# Patient Record
Sex: MALE | Race: WHITE | NOT HISPANIC OR LATINO | Employment: UNEMPLOYED | URBAN - METROPOLITAN AREA
[De-identification: names, ages, dates, MRNs, and addresses within clinical notes are randomized per-mention and may not be internally consistent; named-entity substitution may affect disease eponyms.]

---

## 2021-05-24 ENCOUNTER — RECORDS - HEALTHEAST (OUTPATIENT)
Dept: ADMINISTRATIVE | Facility: CLINIC | Age: 51
End: 2021-05-24

## 2023-06-08 ENCOUNTER — HOSPITAL ENCOUNTER (INPATIENT)
Facility: CLINIC | Age: 53
Setting detail: SURGERY ADMIT
End: 2023-06-08
Attending: ORTHOPAEDIC SURGERY | Admitting: ORTHOPAEDIC SURGERY

## 2023-11-20 RX ORDER — BACLOFEN 10 MG/1
10 TABLET ORAL 2 TIMES DAILY PRN
COMMUNITY
End: 2023-12-17

## 2023-11-20 RX ORDER — SILDENAFIL CITRATE 20 MG/1
20-100 TABLET ORAL PRN
COMMUNITY
Start: 2023-11-16

## 2023-11-20 RX ORDER — TESTOSTERONE CYPIONATE 200 MG/ML
200 INJECTION, SOLUTION INTRAMUSCULAR
COMMUNITY
Start: 2023-07-26 | End: 2023-12-17

## 2023-11-20 RX ORDER — FAMOTIDINE 20 MG/1
20 TABLET, FILM COATED ORAL 2 TIMES DAILY PRN
COMMUNITY
Start: 2023-10-06

## 2023-11-20 RX ORDER — DOXYCYCLINE 100 MG/1
100 CAPSULE ORAL 2 TIMES DAILY
COMMUNITY
Start: 2023-11-08

## 2023-11-20 RX ORDER — MELOXICAM 7.5 MG/1
7.5 TABLET ORAL DAILY
Status: ON HOLD | COMMUNITY
End: 2023-12-20

## 2023-11-20 RX ORDER — RIFAMPIN 300 MG/1
300 CAPSULE ORAL 2 TIMES DAILY
COMMUNITY
Start: 2023-10-27

## 2023-11-20 RX ORDER — NAPROXEN SODIUM 220 MG
220 TABLET ORAL
Status: ON HOLD | COMMUNITY
End: 2023-12-20

## 2023-11-20 RX ORDER — DULAGLUTIDE 1.5 MG/.5ML
1.5 INJECTION, SOLUTION SUBCUTANEOUS
COMMUNITY
Start: 2022-11-23

## 2023-11-20 RX ORDER — LISINOPRIL 40 MG/1
40 TABLET ORAL DAILY
COMMUNITY
Start: 2023-06-08 | End: 2024-06-07

## 2023-11-20 RX ORDER — HYDROXYZINE PAMOATE 25 MG/1
25-50 CAPSULE ORAL EVERY 6 HOURS PRN
COMMUNITY
Start: 2023-10-27 | End: 2023-12-17

## 2023-11-20 RX ORDER — DIPHENHYDRAMINE HCL 50 MG
50 CAPSULE ORAL
COMMUNITY

## 2023-11-20 RX ORDER — OXYCODONE HYDROCHLORIDE 5 MG/1
5-10 TABLET ORAL EVERY 12 HOURS PRN
COMMUNITY
Start: 2023-10-27 | End: 2023-12-17

## 2023-11-20 RX ORDER — ACETAMINOPHEN 500 MG
1000 TABLET ORAL EVERY MORNING
COMMUNITY
Start: 2023-10-06

## 2023-11-20 RX ORDER — AMOXICILLIN 250 MG
1 CAPSULE ORAL 2 TIMES DAILY PRN
COMMUNITY
Start: 2023-10-06 | End: 2023-12-17

## 2023-11-20 RX ORDER — ATORVASTATIN CALCIUM 10 MG/1
10 TABLET, FILM COATED ORAL DAILY
COMMUNITY
Start: 2022-11-23 | End: 2023-12-17

## 2023-11-20 RX ORDER — VITAMIN B COMPLEX
1 TABLET ORAL DAILY
COMMUNITY

## 2023-11-20 RX ORDER — ASPIRIN 81 MG/1
162 TABLET ORAL DAILY
COMMUNITY
Start: 2023-10-06

## 2023-12-17 RX ORDER — TESTOSTERONE GEL, 1% 10 MG/G
2 GEL TRANSDERMAL DAILY
COMMUNITY

## 2023-12-17 RX ORDER — ATORVASTATIN CALCIUM 10 MG/1
10 TABLET, FILM COATED ORAL DAILY
COMMUNITY

## 2023-12-17 NOTE — PHARMACY-ADMISSION MEDICATION HISTORY
Pharmacist Admission Medication History    PTA meds completed by pre-admitting nurse, Sandee Antonio, and reviewed by pharmacy.    Pharmacist called patient to confirm PTA med list (12/17/2023).    Prior to Admission medications    Medication Sig Last Dose Taking? Auth Provider Long Term End Date   acetaminophen (TYLENOL) 500 MG tablet Take 1,000 mg by mouth every morning 12/15/2023 Yes Reported, Patient     aspirin 81 MG EC tablet Take 162 mg by mouth daily Past Week Yes Reported, Patient     atorvastatin (LIPITOR) 10 MG tablet Take 10 mg by mouth daily 12/12/2023 Yes Unknown, Entered By History Yes    diphenhydrAMINE (BENADRYL) 50 MG capsule Take 50 mg by mouth nightly as needed  Yes Reported, Patient     doxycycline monohydrate (MONODOX) 100 MG capsule Take 100 mg by mouth 2 times daily 12/17/2023 at hs Yes Reported, Patient     dulaglutide (TRULICITY) 1.5 MG/0.5ML pen Inject 1.5 mg Subcutaneous every 7 days 12/9/2023 Yes Reported, Patient     empagliflozin (JARDIANCE) 10 MG TABS tablet Take 10 mg by mouth daily 12/14/2023 Yes Reported, Patient     famotidine (PEPCID) 20 MG tablet Take 20 mg by mouth 2 times daily as needed More than a month Yes Reported, Patient     lisinopril (ZESTRIL) 40 MG tablet Take 40 mg by mouth daily 12/14/2023 Yes Reported, Patient Yes 6/7/24   meloxicam (MOBIC) 7.5 MG tablet Take 7.5 mg by mouth daily Past Week Yes Reported, Patient Yes    naproxen sodium (ANAPROX) 220 MG tablet Take 220 mg by mouth daily (with breakfast) Past Week Yes Reported, Patient     rifampin (RIFADIN) 300 MG capsule Take 300 mg by mouth 2 times daily 12/17/2023 at hs Yes Reported, Patient     sildenafil (REVATIO) 20 MG tablet Take  mg by mouth as needed  Yes Reported, Patient Yes    testosterone (ANDROGEL 1 % PUMP) 12.5 MG/ACT (1%) gel Place 2 Pump onto the skin daily Admin #1 pump topically to each shoulder 12/11/2023 Yes Unknown, Entered By History Yes    vitamin B-Complex Take 1 tablet by mouth daily  12/12/2023 Yes Reported, Patient     Vitamin D3 (VITAMIN D, CHOLECALCIFEROL,) 25 mcg (1000 units) tablet Take 1 tablet by mouth daily 12/16/2023 Yes Reported, Patient

## 2023-12-18 ENCOUNTER — APPOINTMENT (OUTPATIENT)
Dept: GENERAL RADIOLOGY | Facility: CLINIC | Age: 53
DRG: 455 | End: 2023-12-18
Attending: ORTHOPAEDIC SURGERY
Payer: COMMERCIAL

## 2023-12-18 ENCOUNTER — HOSPITAL ENCOUNTER (INPATIENT)
Facility: CLINIC | Age: 53
LOS: 3 days | Discharge: HOME | DRG: 455 | End: 2023-12-21
Attending: ORTHOPAEDIC SURGERY | Admitting: ORTHOPAEDIC SURGERY
Payer: COMMERCIAL

## 2023-12-18 ENCOUNTER — ANESTHESIA EVENT (OUTPATIENT)
Dept: SURGERY | Facility: CLINIC | Age: 53
DRG: 455 | End: 2023-12-18
Payer: COMMERCIAL

## 2023-12-18 ENCOUNTER — ANESTHESIA (OUTPATIENT)
Dept: SURGERY | Facility: CLINIC | Age: 53
DRG: 455 | End: 2023-12-18
Payer: COMMERCIAL

## 2023-12-18 DIAGNOSIS — Z98.890 H/O SPINAL SURGERY: Primary | ICD-10-CM

## 2023-12-18 LAB
ABO/RH(D): NORMAL
ANTIBODY SCREEN: NEGATIVE
GLUCOSE BLDC GLUCOMTR-MCNC: 164 MG/DL (ref 70–99)
GLUCOSE BLDC GLUCOMTR-MCNC: 198 MG/DL (ref 70–99)
GLUCOSE BLDC GLUCOMTR-MCNC: 201 MG/DL (ref 70–99)
GLUCOSE BLDC GLUCOMTR-MCNC: 237 MG/DL (ref 70–99)
GLUCOSE BLDC GLUCOMTR-MCNC: 266 MG/DL (ref 70–99)
HGB BLD-MCNC: 15.7 G/DL (ref 13.3–17.7)
SPECIMEN EXPIRATION DATE: NORMAL

## 2023-12-18 PROCEDURE — 85018 HEMOGLOBIN: CPT | Performed by: ORTHOPAEDIC SURGERY

## 2023-12-18 PROCEDURE — 0SG00A0 FUSION OF LUMBAR VERTEBRAL JOINT WITH INTERBODY FUSION DEVICE, ANTERIOR APPROACH, ANTERIOR COLUMN, OPEN APPROACH: ICD-10-PCS | Performed by: ORTHOPAEDIC SURGERY

## 2023-12-18 PROCEDURE — 250N000011 HC RX IP 250 OP 636: Performed by: NURSE ANESTHETIST, CERTIFIED REGISTERED

## 2023-12-18 PROCEDURE — 8E0WXBF COMPUTER ASSISTED PROCEDURE OF TRUNK REGION, WITH FLUOROSCOPY: ICD-10-PCS | Performed by: ORTHOPAEDIC SURGERY

## 2023-12-18 PROCEDURE — 250N000013 HC RX MED GY IP 250 OP 250 PS 637: Performed by: STUDENT IN AN ORGANIZED HEALTH CARE EDUCATION/TRAINING PROGRAM

## 2023-12-18 PROCEDURE — 272N000002 HC OR SUPPLY OTHER OPNP: Performed by: ORTHOPAEDIC SURGERY

## 2023-12-18 PROCEDURE — 4A11X4G MONITORING OF PERIPHERAL NERVOUS ELECTRICAL ACTIVITY, INTRAOPERATIVE, EXTERNAL APPROACH: ICD-10-PCS | Performed by: ORTHOPAEDIC SURGERY

## 2023-12-18 PROCEDURE — 250N000012 HC RX MED GY IP 250 OP 636 PS 637: Performed by: STUDENT IN AN ORGANIZED HEALTH CARE EDUCATION/TRAINING PROGRAM

## 2023-12-18 PROCEDURE — 250N000013 HC RX MED GY IP 250 OP 250 PS 637

## 2023-12-18 PROCEDURE — 250N000009 HC RX 250: Performed by: ORTHOPAEDIC SURGERY

## 2023-12-18 PROCEDURE — 999N000179 XR SURGERY CARM FLUORO LESS THAN 5 MIN W STILLS: Mod: TC

## 2023-12-18 PROCEDURE — 36415 COLL VENOUS BLD VENIPUNCTURE: CPT | Performed by: ORTHOPAEDIC SURGERY

## 2023-12-18 PROCEDURE — 250N000011 HC RX IP 250 OP 636

## 2023-12-18 PROCEDURE — 370N000017 HC ANESTHESIA TECHNICAL FEE, PER MIN: Performed by: ORTHOPAEDIC SURGERY

## 2023-12-18 PROCEDURE — 250N000009 HC RX 250: Performed by: NURSE ANESTHETIST, CERTIFIED REGISTERED

## 2023-12-18 PROCEDURE — 250N000011 HC RX IP 250 OP 636: Mod: JZ | Performed by: ORTHOPAEDIC SURGERY

## 2023-12-18 PROCEDURE — 272N000001 HC OR GENERAL SUPPLY STERILE: Performed by: ORTHOPAEDIC SURGERY

## 2023-12-18 PROCEDURE — 922N000001 HC NEURO MONITORING SERVICE, UP TO 7 HOURS (T1FEE): Performed by: ORTHOPAEDIC SURGERY

## 2023-12-18 PROCEDURE — 710N000009 HC RECOVERY PHASE 1, LEVEL 1, PER MIN: Performed by: ORTHOPAEDIC SURGERY

## 2023-12-18 PROCEDURE — 999N000141 HC STATISTIC PRE-PROCEDURE NURSING ASSESSMENT: Performed by: ORTHOPAEDIC SURGERY

## 2023-12-18 PROCEDURE — C1713 ANCHOR/SCREW BN/BN,TIS/BN: HCPCS | Performed by: ORTHOPAEDIC SURGERY

## 2023-12-18 PROCEDURE — 360N000086 HC SURGERY LEVEL 6 W/ FLUORO, PER MIN: Performed by: ORTHOPAEDIC SURGERY

## 2023-12-18 PROCEDURE — 258N000003 HC RX IP 258 OP 636: Performed by: ANESTHESIOLOGY

## 2023-12-18 PROCEDURE — 250N000011 HC RX IP 250 OP 636: Performed by: ORTHOPAEDIC SURGERY

## 2023-12-18 PROCEDURE — 120N000001 HC R&B MED SURG/OB

## 2023-12-18 PROCEDURE — 258N000003 HC RX IP 258 OP 636

## 2023-12-18 PROCEDURE — 258N000003 HC RX IP 258 OP 636: Performed by: ORTHOPAEDIC SURGERY

## 2023-12-18 PROCEDURE — 258N000003 HC RX IP 258 OP 636: Performed by: NURSE ANESTHETIST, CERTIFIED REGISTERED

## 2023-12-18 PROCEDURE — 250N000011 HC RX IP 250 OP 636: Performed by: ANESTHESIOLOGY

## 2023-12-18 PROCEDURE — 250N000013 HC RX MED GY IP 250 OP 250 PS 637: Performed by: ANESTHESIOLOGY

## 2023-12-18 PROCEDURE — 86900 BLOOD TYPING SEROLOGIC ABO: CPT | Performed by: ORTHOPAEDIC SURGERY

## 2023-12-18 PROCEDURE — 01NB0ZZ RELEASE LUMBAR NERVE, OPEN APPROACH: ICD-10-PCS | Performed by: ORTHOPAEDIC SURGERY

## 2023-12-18 PROCEDURE — 0SG0371 FUSION OF LUMBAR VERTEBRAL JOINT WITH AUTOLOGOUS TISSUE SUBSTITUTE, POSTERIOR APPROACH, POSTERIOR COLUMN, PERCUTANEOUS APPROACH: ICD-10-PCS | Performed by: ORTHOPAEDIC SURGERY

## 2023-12-18 PROCEDURE — 250N000025 HC SEVOFLURANE, PER MIN: Performed by: ORTHOPAEDIC SURGERY

## 2023-12-18 PROCEDURE — 250N000013 HC RX MED GY IP 250 OP 250 PS 637: Performed by: NURSE PRACTITIONER

## 2023-12-18 PROCEDURE — 99254 IP/OBS CNSLTJ NEW/EST MOD 60: CPT | Performed by: STUDENT IN AN ORGANIZED HEALTH CARE EDUCATION/TRAINING PROGRAM

## 2023-12-18 PROCEDURE — 0ST20ZZ RESECTION OF LUMBAR VERTEBRAL DISC, OPEN APPROACH: ICD-10-PCS | Performed by: ORTHOPAEDIC SURGERY

## 2023-12-18 PROCEDURE — 99254 IP/OBS CNSLTJ NEW/EST MOD 60: CPT | Mod: VID | Performed by: NURSE PRACTITIONER

## 2023-12-18 DEVICE — IMP WIRE KIRSCHNER AMENDIA 1.4MMX18" THRD 9080-18T: Type: IMPLANTABLE DEVICE | Site: SPINE LUMBAR | Status: FUNCTIONAL

## 2023-12-18 DEVICE — SCREW SET SPNE STAR OPN LNK PATHLOC-L STRL LF: Type: IMPLANTABLE DEVICE | Site: SPINE LUMBAR | Status: FUNCTIONAL

## 2023-12-18 DEVICE — IMPLANTABLE DEVICE: Type: IMPLANTABLE DEVICE | Site: SPINE LUMBAR | Status: FUNCTIONAL

## 2023-12-18 RX ORDER — METHOCARBAMOL 750 MG/1
750 TABLET, FILM COATED ORAL EVERY 6 HOURS
Status: DISCONTINUED | OUTPATIENT
Start: 2023-12-18 | End: 2023-12-19

## 2023-12-18 RX ORDER — BUPIVACAINE HYDROCHLORIDE 2.5 MG/ML
INJECTION, SOLUTION EPIDURAL; INFILTRATION; INTRACAUDAL PRN
Status: DISCONTINUED | OUTPATIENT
Start: 2023-12-18 | End: 2023-12-18 | Stop reason: HOSPADM

## 2023-12-18 RX ORDER — ONDANSETRON 4 MG/1
4 TABLET, ORALLY DISINTEGRATING ORAL EVERY 6 HOURS PRN
Status: DISCONTINUED | OUTPATIENT
Start: 2023-12-18 | End: 2023-12-21 | Stop reason: HOSPADM

## 2023-12-18 RX ORDER — FENTANYL CITRATE 50 UG/ML
25 INJECTION, SOLUTION INTRAMUSCULAR; INTRAVENOUS EVERY 5 MIN PRN
Status: DISCONTINUED | OUTPATIENT
Start: 2023-12-18 | End: 2023-12-18 | Stop reason: HOSPADM

## 2023-12-18 RX ORDER — RIFAMPIN 300 MG/1
300 CAPSULE ORAL 2 TIMES DAILY
Status: DISCONTINUED | OUTPATIENT
Start: 2023-12-18 | End: 2023-12-21 | Stop reason: HOSPADM

## 2023-12-18 RX ORDER — HYDROMORPHONE HCL IN WATER/PF 6 MG/30 ML
0.4 PATIENT CONTROLLED ANALGESIA SYRINGE INTRAVENOUS EVERY 5 MIN PRN
Status: DISCONTINUED | OUTPATIENT
Start: 2023-12-18 | End: 2023-12-18 | Stop reason: HOSPADM

## 2023-12-18 RX ORDER — SODIUM CHLORIDE, SODIUM LACTATE, POTASSIUM CHLORIDE, CALCIUM CHLORIDE 600; 310; 30; 20 MG/100ML; MG/100ML; MG/100ML; MG/100ML
INJECTION, SOLUTION INTRAVENOUS CONTINUOUS
Status: DISCONTINUED | OUTPATIENT
Start: 2023-12-18 | End: 2023-12-18 | Stop reason: HOSPADM

## 2023-12-18 RX ORDER — DEXAMETHASONE SODIUM PHOSPHATE 4 MG/ML
INJECTION, SOLUTION INTRA-ARTICULAR; INTRALESIONAL; INTRAMUSCULAR; INTRAVENOUS; SOFT TISSUE PRN
Status: DISCONTINUED | OUTPATIENT
Start: 2023-12-18 | End: 2023-12-18

## 2023-12-18 RX ORDER — LABETALOL HYDROCHLORIDE 5 MG/ML
10-20 INJECTION, SOLUTION INTRAVENOUS EVERY 6 HOURS PRN
Status: DISCONTINUED | OUTPATIENT
Start: 2023-12-18 | End: 2023-12-21 | Stop reason: HOSPADM

## 2023-12-18 RX ORDER — AMOXICILLIN 250 MG
1 CAPSULE ORAL 2 TIMES DAILY
Status: DISCONTINUED | OUTPATIENT
Start: 2023-12-18 | End: 2023-12-21 | Stop reason: HOSPADM

## 2023-12-18 RX ORDER — LABETALOL HYDROCHLORIDE 5 MG/ML
10 INJECTION, SOLUTION INTRAVENOUS ONCE
Status: COMPLETED | OUTPATIENT
Start: 2023-12-18 | End: 2023-12-18

## 2023-12-18 RX ORDER — HYDROMORPHONE HCL IN WATER/PF 6 MG/30 ML
0.2 PATIENT CONTROLLED ANALGESIA SYRINGE INTRAVENOUS EVERY 5 MIN PRN
Status: DISCONTINUED | OUTPATIENT
Start: 2023-12-18 | End: 2023-12-18 | Stop reason: HOSPADM

## 2023-12-18 RX ORDER — LIDOCAINE 40 MG/G
CREAM TOPICAL
Status: DISCONTINUED | OUTPATIENT
Start: 2023-12-18 | End: 2023-12-18 | Stop reason: HOSPADM

## 2023-12-18 RX ORDER — NALOXONE HYDROCHLORIDE 0.4 MG/ML
0.2 INJECTION, SOLUTION INTRAMUSCULAR; INTRAVENOUS; SUBCUTANEOUS
Status: DISCONTINUED | OUTPATIENT
Start: 2023-12-18 | End: 2023-12-21 | Stop reason: HOSPADM

## 2023-12-18 RX ORDER — HYDROMORPHONE HYDROCHLORIDE 4 MG/1
4 TABLET ORAL
Status: DISCONTINUED | OUTPATIENT
Start: 2023-12-18 | End: 2023-12-19

## 2023-12-18 RX ORDER — ONDANSETRON 2 MG/ML
4 INJECTION INTRAMUSCULAR; INTRAVENOUS EVERY 30 MIN PRN
Status: DISCONTINUED | OUTPATIENT
Start: 2023-12-18 | End: 2023-12-18 | Stop reason: HOSPADM

## 2023-12-18 RX ORDER — OXYCODONE HYDROCHLORIDE 5 MG/1
5 TABLET ORAL
Status: DISCONTINUED | OUTPATIENT
Start: 2023-12-18 | End: 2023-12-18 | Stop reason: HOSPADM

## 2023-12-18 RX ORDER — FENTANYL CITRATE 50 UG/ML
50 INJECTION, SOLUTION INTRAMUSCULAR; INTRAVENOUS EVERY 5 MIN PRN
Status: DISCONTINUED | OUTPATIENT
Start: 2023-12-18 | End: 2023-12-18 | Stop reason: HOSPADM

## 2023-12-18 RX ORDER — ACETAMINOPHEN 325 MG/1
975 TABLET ORAL EVERY 8 HOURS
Status: COMPLETED | OUTPATIENT
Start: 2023-12-18 | End: 2023-12-21

## 2023-12-18 RX ORDER — GLYCOPYRROLATE 0.2 MG/ML
INJECTION, SOLUTION INTRAMUSCULAR; INTRAVENOUS PRN
Status: DISCONTINUED | OUTPATIENT
Start: 2023-12-18 | End: 2023-12-18

## 2023-12-18 RX ORDER — HYDROMORPHONE HCL IN WATER/PF 6 MG/30 ML
0.2 PATIENT CONTROLLED ANALGESIA SYRINGE INTRAVENOUS
Status: DISCONTINUED | OUTPATIENT
Start: 2023-12-18 | End: 2023-12-18

## 2023-12-18 RX ORDER — POLYETHYLENE GLYCOL 3350 17 G/17G
17 POWDER, FOR SOLUTION ORAL DAILY
Status: DISCONTINUED | OUTPATIENT
Start: 2023-12-19 | End: 2023-12-21 | Stop reason: HOSPADM

## 2023-12-18 RX ORDER — LIDOCAINE 40 MG/G
CREAM TOPICAL
Status: DISCONTINUED | OUTPATIENT
Start: 2023-12-18 | End: 2023-12-21 | Stop reason: HOSPADM

## 2023-12-18 RX ORDER — FENTANYL CITRATE 50 UG/ML
INJECTION, SOLUTION INTRAMUSCULAR; INTRAVENOUS PRN
Status: DISCONTINUED | OUTPATIENT
Start: 2023-12-18 | End: 2023-12-18

## 2023-12-18 RX ORDER — ONDANSETRON 2 MG/ML
4 INJECTION INTRAMUSCULAR; INTRAVENOUS EVERY 6 HOURS PRN
Status: DISCONTINUED | OUTPATIENT
Start: 2023-12-18 | End: 2023-12-21 | Stop reason: HOSPADM

## 2023-12-18 RX ORDER — HYDROMORPHONE HCL IN WATER/PF 6 MG/30 ML
0.2 PATIENT CONTROLLED ANALGESIA SYRINGE INTRAVENOUS EVERY 4 HOURS PRN
Status: DISCONTINUED | OUTPATIENT
Start: 2023-12-18 | End: 2023-12-20

## 2023-12-18 RX ORDER — FAMOTIDINE 20 MG/1
20 TABLET, FILM COATED ORAL 2 TIMES DAILY PRN
Status: DISCONTINUED | OUTPATIENT
Start: 2023-12-18 | End: 2023-12-21 | Stop reason: HOSPADM

## 2023-12-18 RX ORDER — OXYCODONE HCL 10 MG/1
20 TABLET, FILM COATED, EXTENDED RELEASE ORAL EVERY 12 HOURS
Status: DISCONTINUED | OUTPATIENT
Start: 2023-12-18 | End: 2023-12-18

## 2023-12-18 RX ORDER — DEXTROSE MONOHYDRATE 25 G/50ML
25-50 INJECTION, SOLUTION INTRAVENOUS
Status: DISCONTINUED | OUTPATIENT
Start: 2023-12-18 | End: 2023-12-21 | Stop reason: HOSPADM

## 2023-12-18 RX ORDER — METHOCARBAMOL 750 MG/1
750 TABLET, FILM COATED ORAL EVERY 6 HOURS PRN
Status: DISCONTINUED | OUTPATIENT
Start: 2023-12-18 | End: 2023-12-18

## 2023-12-18 RX ORDER — LIDOCAINE HYDROCHLORIDE 20 MG/ML
INJECTION, SOLUTION INFILTRATION; PERINEURAL PRN
Status: DISCONTINUED | OUTPATIENT
Start: 2023-12-18 | End: 2023-12-18

## 2023-12-18 RX ORDER — BISACODYL 10 MG
10 SUPPOSITORY, RECTAL RECTAL DAILY PRN
Status: DISCONTINUED | OUTPATIENT
Start: 2023-12-18 | End: 2023-12-21 | Stop reason: HOSPADM

## 2023-12-18 RX ORDER — DOXYCYCLINE 100 MG/1
100 CAPSULE ORAL 2 TIMES DAILY
Status: DISCONTINUED | OUTPATIENT
Start: 2023-12-18 | End: 2023-12-21 | Stop reason: HOSPADM

## 2023-12-18 RX ORDER — OXYCODONE HYDROCHLORIDE 10 MG/1
10 TABLET ORAL
Status: COMPLETED | OUTPATIENT
Start: 2023-12-18 | End: 2023-12-18

## 2023-12-18 RX ORDER — TRANEXAMIC ACID 10 MG/ML
5 INJECTION, SOLUTION INTRAVENOUS CONTINUOUS
Status: DISCONTINUED | OUTPATIENT
Start: 2023-12-18 | End: 2023-12-18 | Stop reason: HOSPADM

## 2023-12-18 RX ORDER — PROPOFOL 10 MG/ML
INJECTION, EMULSION INTRAVENOUS PRN
Status: DISCONTINUED | OUTPATIENT
Start: 2023-12-18 | End: 2023-12-18

## 2023-12-18 RX ORDER — ATORVASTATIN CALCIUM 10 MG/1
10 TABLET, FILM COATED ORAL DAILY
Status: DISCONTINUED | OUTPATIENT
Start: 2023-12-18 | End: 2023-12-21 | Stop reason: HOSPADM

## 2023-12-18 RX ORDER — PROCHLORPERAZINE MALEATE 5 MG
10 TABLET ORAL EVERY 6 HOURS PRN
Status: DISCONTINUED | OUTPATIENT
Start: 2023-12-18 | End: 2023-12-21 | Stop reason: HOSPADM

## 2023-12-18 RX ORDER — OXYCODONE HYDROCHLORIDE 10 MG/1
10 TABLET ORAL EVERY 4 HOURS PRN
Status: DISCONTINUED | OUTPATIENT
Start: 2023-12-18 | End: 2023-12-18

## 2023-12-18 RX ORDER — CEFAZOLIN SODIUM/WATER 3 G/30 ML
3 SYRINGE (ML) INTRAVENOUS
Status: COMPLETED | OUTPATIENT
Start: 2023-12-18 | End: 2023-12-18

## 2023-12-18 RX ORDER — SODIUM CHLORIDE 9 MG/ML
INJECTION, SOLUTION INTRAVENOUS CONTINUOUS
Status: DISCONTINUED | OUTPATIENT
Start: 2023-12-18 | End: 2023-12-21 | Stop reason: HOSPADM

## 2023-12-18 RX ORDER — KETAMINE HYDROCHLORIDE 10 MG/ML
INJECTION INTRAMUSCULAR; INTRAVENOUS PRN
Status: DISCONTINUED | OUTPATIENT
Start: 2023-12-18 | End: 2023-12-18

## 2023-12-18 RX ORDER — ONDANSETRON 4 MG/1
4 TABLET, ORALLY DISINTEGRATING ORAL EVERY 30 MIN PRN
Status: DISCONTINUED | OUTPATIENT
Start: 2023-12-18 | End: 2023-12-18 | Stop reason: HOSPADM

## 2023-12-18 RX ORDER — NICOTINE POLACRILEX 4 MG
15-30 LOZENGE BUCCAL
Status: DISCONTINUED | OUTPATIENT
Start: 2023-12-18 | End: 2023-12-21 | Stop reason: HOSPADM

## 2023-12-18 RX ORDER — NALOXONE HYDROCHLORIDE 0.4 MG/ML
0.4 INJECTION, SOLUTION INTRAMUSCULAR; INTRAVENOUS; SUBCUTANEOUS
Status: DISCONTINUED | OUTPATIENT
Start: 2023-12-18 | End: 2023-12-21 | Stop reason: HOSPADM

## 2023-12-18 RX ORDER — ONDANSETRON 2 MG/ML
INJECTION INTRAMUSCULAR; INTRAVENOUS PRN
Status: DISCONTINUED | OUTPATIENT
Start: 2023-12-18 | End: 2023-12-18

## 2023-12-18 RX ORDER — CEFAZOLIN SODIUM/WATER 3 G/30 ML
3 SYRINGE (ML) INTRAVENOUS SEE ADMIN INSTRUCTIONS
Status: DISCONTINUED | OUTPATIENT
Start: 2023-12-18 | End: 2023-12-18 | Stop reason: HOSPADM

## 2023-12-18 RX ORDER — ACETAMINOPHEN 325 MG/1
650 TABLET ORAL EVERY 4 HOURS PRN
Status: DISCONTINUED | OUTPATIENT
Start: 2023-12-21 | End: 2023-12-21 | Stop reason: HOSPADM

## 2023-12-18 RX ORDER — HYDROMORPHONE HYDROCHLORIDE 1 MG/ML
0.5 INJECTION, SOLUTION INTRAMUSCULAR; INTRAVENOUS; SUBCUTANEOUS
Status: DISCONTINUED | OUTPATIENT
Start: 2023-12-18 | End: 2023-12-18

## 2023-12-18 RX ORDER — HYDROMORPHONE HCL IN WATER/PF 6 MG/30 ML
0.4 PATIENT CONTROLLED ANALGESIA SYRINGE INTRAVENOUS EVERY 4 HOURS PRN
Status: DISCONTINUED | OUTPATIENT
Start: 2023-12-18 | End: 2023-12-20

## 2023-12-18 RX ORDER — HYDROXYZINE HYDROCHLORIDE 25 MG/1
25 TABLET, FILM COATED ORAL EVERY 6 HOURS PRN
Status: DISCONTINUED | OUTPATIENT
Start: 2023-12-18 | End: 2023-12-21 | Stop reason: HOSPADM

## 2023-12-18 RX ADMIN — DEXMEDETOMIDINE HYDROCHLORIDE 4 MCG: 100 INJECTION, SOLUTION INTRAVENOUS at 08:08

## 2023-12-18 RX ADMIN — PROPOFOL 100 MCG/KG/MIN: 10 INJECTION, EMULSION INTRAVENOUS at 08:11

## 2023-12-18 RX ADMIN — SODIUM CHLORIDE: 9 INJECTION, SOLUTION INTRAVENOUS at 14:34

## 2023-12-18 RX ADMIN — OXYCODONE HYDROCHLORIDE 10 MG: 5 TABLET ORAL at 11:46

## 2023-12-18 RX ADMIN — HYDROMORPHONE HYDROCHLORIDE 0.2 MG: 0.2 INJECTION, SOLUTION INTRAMUSCULAR; INTRAVENOUS; SUBCUTANEOUS at 12:18

## 2023-12-18 RX ADMIN — PROPOFOL 90 MCG/KG/MIN: 10 INJECTION, EMULSION INTRAVENOUS at 09:29

## 2023-12-18 RX ADMIN — HYDROMORPHONE HYDROCHLORIDE 0.5 MG: 1 INJECTION, SOLUTION INTRAMUSCULAR; INTRAVENOUS; SUBCUTANEOUS at 14:29

## 2023-12-18 RX ADMIN — PHENYLEPHRINE HYDROCHLORIDE 50 MCG: 10 INJECTION INTRAVENOUS at 08:52

## 2023-12-18 RX ADMIN — RIFAMPIN 300 MG: 300 CAPSULE ORAL at 19:52

## 2023-12-18 RX ADMIN — SODIUM CHLORIDE, POTASSIUM CHLORIDE, SODIUM LACTATE AND CALCIUM CHLORIDE: 600; 310; 30; 20 INJECTION, SOLUTION INTRAVENOUS at 07:39

## 2023-12-18 RX ADMIN — HYDROMORPHONE HYDROCHLORIDE 0.5 MG: 1 INJECTION, SOLUTION INTRAMUSCULAR; INTRAVENOUS; SUBCUTANEOUS at 08:08

## 2023-12-18 RX ADMIN — INSULIN ASPART 2 UNITS: 100 INJECTION, SOLUTION INTRAVENOUS; SUBCUTANEOUS at 19:02

## 2023-12-18 RX ADMIN — ROCURONIUM BROMIDE 10 MG: 50 INJECTION, SOLUTION INTRAVENOUS at 07:47

## 2023-12-18 RX ADMIN — MIDAZOLAM 2 MG: 1 INJECTION INTRAMUSCULAR; INTRAVENOUS at 07:42

## 2023-12-18 RX ADMIN — Medication 140 MG: at 07:47

## 2023-12-18 RX ADMIN — DOXYCYCLINE HYCLATE 100 MG: 100 CAPSULE ORAL at 19:52

## 2023-12-18 RX ADMIN — PHENYLEPHRINE HYDROCHLORIDE 50 MCG: 10 INJECTION INTRAVENOUS at 08:50

## 2023-12-18 RX ADMIN — FENTANYL CITRATE 50 MCG: 50 INJECTION, SOLUTION INTRAMUSCULAR; INTRAVENOUS at 10:18

## 2023-12-18 RX ADMIN — DEXMEDETOMIDINE HYDROCHLORIDE 4 MCG: 100 INJECTION, SOLUTION INTRAVENOUS at 08:00

## 2023-12-18 RX ADMIN — FENTANYL CITRATE 50 MCG: 50 INJECTION, SOLUTION INTRAMUSCULAR; INTRAVENOUS at 10:27

## 2023-12-18 RX ADMIN — HYDROXYZINE HYDROCHLORIDE 25 MG: 25 TABLET, FILM COATED ORAL at 22:02

## 2023-12-18 RX ADMIN — PROPOFOL 250 MG: 10 INJECTION, EMULSION INTRAVENOUS at 07:47

## 2023-12-18 RX ADMIN — Medication 3 G: at 07:38

## 2023-12-18 RX ADMIN — METHOCARBAMOL TABLETS 750 MG: 750 TABLET, COATED ORAL at 23:52

## 2023-12-18 RX ADMIN — HYDROMORPHONE HYDROCHLORIDE 4 MG: 4 TABLET ORAL at 15:45

## 2023-12-18 RX ADMIN — PHENYLEPHRINE HYDROCHLORIDE 50 MCG: 10 INJECTION INTRAVENOUS at 08:34

## 2023-12-18 RX ADMIN — ONDANSETRON 4 MG: 2 INJECTION INTRAMUSCULAR; INTRAVENOUS at 09:39

## 2023-12-18 RX ADMIN — HYDROMORPHONE HYDROCHLORIDE 0.2 MG: 0.2 INJECTION, SOLUTION INTRAMUSCULAR; INTRAVENOUS; SUBCUTANEOUS at 12:28

## 2023-12-18 RX ADMIN — DEXMEDETOMIDINE HYDROCHLORIDE 4 MCG: 100 INJECTION, SOLUTION INTRAVENOUS at 07:53

## 2023-12-18 RX ADMIN — HYDROMORPHONE HYDROCHLORIDE 0.2 MG: 0.2 INJECTION, SOLUTION INTRAMUSCULAR; INTRAVENOUS; SUBCUTANEOUS at 10:53

## 2023-12-18 RX ADMIN — HYDROMORPHONE HYDROCHLORIDE 4 MG: 4 TABLET ORAL at 18:57

## 2023-12-18 RX ADMIN — LIDOCAINE HYDROCHLORIDE 50 MG: 20 INJECTION, SOLUTION INFILTRATION; PERINEURAL at 07:47

## 2023-12-18 RX ADMIN — SODIUM CHLORIDE, POTASSIUM CHLORIDE, SODIUM LACTATE AND CALCIUM CHLORIDE: 600; 310; 30; 20 INJECTION, SOLUTION INTRAVENOUS at 07:01

## 2023-12-18 RX ADMIN — HYDROMORPHONE HYDROCHLORIDE 0.2 MG: 0.2 INJECTION, SOLUTION INTRAMUSCULAR; INTRAVENOUS; SUBCUTANEOUS at 11:30

## 2023-12-18 RX ADMIN — METHOCARBAMOL TABLETS 750 MG: 750 TABLET, COATED ORAL at 16:53

## 2023-12-18 RX ADMIN — SODIUM CHLORIDE, POTASSIUM CHLORIDE, SODIUM LACTATE AND CALCIUM CHLORIDE: 600; 310; 30; 20 INJECTION, SOLUTION INTRAVENOUS at 09:38

## 2023-12-18 RX ADMIN — ACETAMINOPHEN 975 MG: 325 TABLET, FILM COATED ORAL at 18:57

## 2023-12-18 RX ADMIN — LABETALOL HYDROCHLORIDE 10 MG: 5 INJECTION, SOLUTION INTRAVENOUS at 12:07

## 2023-12-18 RX ADMIN — HYDROMORPHONE HYDROCHLORIDE 0.2 MG: 1 INJECTION, SOLUTION INTRAMUSCULAR; INTRAVENOUS; SUBCUTANEOUS at 10:47

## 2023-12-18 RX ADMIN — Medication 50 MG: at 07:47

## 2023-12-18 RX ADMIN — METHOCARBAMOL 750 MG: 750 TABLET ORAL at 11:02

## 2023-12-18 RX ADMIN — PHENYLEPHRINE HYDROCHLORIDE 0.1 MCG/KG/MIN: 10 INJECTION INTRAVENOUS at 08:57

## 2023-12-18 RX ADMIN — TRANEXAMIC ACID 1.44 G: 1 INJECTION, SOLUTION INTRAVENOUS at 07:57

## 2023-12-18 RX ADMIN — PROPOFOL 80 MCG/KG/MIN: 10 INJECTION, EMULSION INTRAVENOUS at 08:48

## 2023-12-18 RX ADMIN — GLYCOPYRROLATE 0.2 MG: 0.2 INJECTION, SOLUTION INTRAMUSCULAR; INTRAVENOUS at 07:47

## 2023-12-18 RX ADMIN — DEXMEDETOMIDINE HYDROCHLORIDE 4 MCG: 100 INJECTION, SOLUTION INTRAVENOUS at 08:06

## 2023-12-18 RX ADMIN — ACETAMINOPHEN 975 MG: 325 TABLET, FILM COATED ORAL at 11:03

## 2023-12-18 RX ADMIN — DEXAMETHASONE SODIUM PHOSPHATE 4 MG: 4 INJECTION, SOLUTION INTRA-ARTICULAR; INTRALESIONAL; INTRAMUSCULAR; INTRAVENOUS; SOFT TISSUE at 08:39

## 2023-12-18 RX ADMIN — HYDROMORPHONE HYDROCHLORIDE 6 MG: 2 TABLET ORAL at 22:02

## 2023-12-18 RX ADMIN — PHENYLEPHRINE HYDROCHLORIDE 50 MCG: 10 INJECTION INTRAVENOUS at 09:55

## 2023-12-18 RX ADMIN — HYDROMORPHONE HYDROCHLORIDE 0.5 MG: 1 INJECTION, SOLUTION INTRAMUSCULAR; INTRAVENOUS; SUBCUTANEOUS at 07:56

## 2023-12-18 RX ADMIN — HYDROXYZINE HYDROCHLORIDE 25 MG: 25 TABLET, FILM COATED ORAL at 11:03

## 2023-12-18 RX ADMIN — DEXMEDETOMIDINE HYDROCHLORIDE 0.3 MCG/KG/HR: 100 INJECTION, SOLUTION INTRAVENOUS at 08:15

## 2023-12-18 RX ADMIN — FENTANYL CITRATE 100 MCG: 50 INJECTION INTRAMUSCULAR; INTRAVENOUS at 07:47

## 2023-12-18 ASSESSMENT — ACTIVITIES OF DAILY LIVING (ADL)
ADLS_ACUITY_SCORE: 20
ADLS_ACUITY_SCORE: 20
ADLS_ACUITY_SCORE: 21
ADLS_ACUITY_SCORE: 22
ADLS_ACUITY_SCORE: 21
ADLS_ACUITY_SCORE: 20
ADLS_ACUITY_SCORE: 21
ADLS_ACUITY_SCORE: 22
ADLS_ACUITY_SCORE: 21

## 2023-12-18 ASSESSMENT — ENCOUNTER SYMPTOMS: DYSRHYTHMIAS: 0

## 2023-12-18 NOTE — ANESTHESIA POSTPROCEDURE EVALUATION
Patient: Francisco Cortés    Procedure: Procedure(s):  Lumbar four to lumbar five interbody and posterolateral fusion minimally invasive       Anesthesia Type:  General    Note:  Disposition: Inpatient   Postop Pain Control: Uneventful            Sign Out: Well controlled pain   PONV: No   Neuro/Psych: Uneventful            Sign Out: Acceptable/Baseline neuro status   Airway/Respiratory: Uneventful            Sign Out: Acceptable/Baseline resp. status   CV/Hemodynamics: Uneventful            Sign Out: Acceptable CV status; No obvious hypovolemia; No obvious fluid overload   Other NRE:    DID A NON-ROUTINE EVENT OCCUR?            Last vitals:  Vitals Value Taken Time   /64 12/18/23 1222   Temp 97.8  F (36.6  C) 12/18/23 1220   Pulse 85 12/18/23 1234   Resp 63 12/18/23 1234   SpO2 98 % 12/18/23 1235   Vitals shown include unfiled device data.    Electronically Signed By: Claude Velasco MD  December 18, 2023  3:30 PM

## 2023-12-18 NOTE — ANESTHESIA PROCEDURE NOTES
Airway       Patient location during procedure: OR       Procedure Start/Stop Times: 12/18/2023 7:50 AM  Staff -        CRNA: Jefferson Georges APRN CRNA       Performed By: CRNA  Consent for Airway        Urgency: elective  Indications and Patient Condition       Indications for airway management: jeronimo-procedural       Induction type:intravenous       Mask difficulty assessment: 2 - vent by mask + OA or adjuvant +/- NMBA    Final Airway Details       Final airway type: endotracheal airway       Successful airway: ETT - single and Oral  Endotracheal Airway Details        ETT size (mm): 8.0       Cuffed: yes       Cuff volume (mL): 6       Successful intubation technique: video laryngoscopy       VL Blade Size: Glidescope 4       Grade View of Cords: 1       Adjucts: stylet       Position: Right       Measured from: gums/teeth       Secured at (cm): 24       Bite block used: Soft    Post intubation assessment        Placement verified by: capnometry, equal breath sounds and chest rise        Number of attempts at approach: 1       Number of other approaches attempted: 0       Secured with: tape       Ease of procedure: easy       Dentition: Intact and Unchanged    Medication(s) Administered   Medication Administration Time: 12/18/2023 7:50 AM

## 2023-12-18 NOTE — ANESTHESIA PREPROCEDURE EVALUATION
Anesthesia Pre-Procedure Evaluation    Patient: Francisco Cortés   MRN: [id number] : 1970        Procedure : Procedure(s):  Lumbar four to lumbar five interbody and posterolateral fusion minimally invasive versus open          Past Medical History:   Diagnosis Date    Arthritis     Diabetes (H)     Hypertension     Obese     Other chronic pain       Past Surgical History:   Procedure Laterality Date    ARTHROPLASTY REVISION KNEE Right 10/04/2023    ARTHROSCOPY KNEE INCISION AND DRAINAGE Right 2023    HAND SURGERY Left     TOTAL HIP ARTHROPLASTY Right 2014    TOTAL HIP ARTHROPLASTY Left 2014    TOTAL KNEE ARTHROPLASTY Right 2023      Allergies   Allergen Reactions    Nuts Anaphylaxis     Only Tree nuts such as Walnuts and Pecans per pt   Tongue and throat swelling    Pregabalin Other (See Comments)     Suicidal idealation per patient    Gabapentin Other (See Comments)     Suicidal ideation    Tramadol Itching      Social History     Tobacco Use    Smoking status: Former     Types: Cigars     Quit date:      Years since quittin.9    Smokeless tobacco: Never   Substance Use Topics    Alcohol use: Yes     Comment: occ      Wt Readings from Last 1 Encounters:   23 144.9 kg (319 lb 6.4 oz)        Anesthesia Evaluation            ROS/MED HX  ENT/Pulmonary:    (-) sleep apnea   Neurologic:     (+)    peripheral neuropathy,                            Cardiovascular: Comment: DATE OF SERVICE:  2015     CARDIOVASCULAR MAGNETIC RESONANCE     REQUESTING PROVIDER: Krysta Carpenter MD     INDICATION: Hemochromatosis, for evaluation of cardiac iron overload.     TECHNIQUE: Standard cardiovascular MRI sequences are used to evaluate   structure and function. T2* values were determined in the myocardium.     FINDINGS: Left ventricle is normal in size with normal left ventricular   systolic function. Borderline concentric left ventricular hypertrophy is   noted. Left ventricular  "end-diastolic dimension was 5.7 cm, left   ventricular end-systolic dimension is 3.5 cm, intraventricular septum 1.5   cm, left ventricular posterior wall 1.1 cm. Left ventricular ejection   fraction is 57 percent. Right ventricle appears to be normal in size with   normal right ventricular systolic function. Left atrium appears to be   mildly enlarged. Right atrium appears to be normal in size.     The aortic valve is grossly normal in structure and function, mitral valve   is grossly normal in structure and function, tricuspid valve is grossly   normal in structure and function. Pulmonic valve is grossly normal in   structure and function. T2* measurements in the myocardium were done (   multiple measurements were done), T2* is greater than 20 milliseconds,   i.e. normal T2* values indicating no significant iron overload in the   myocardium.       (+) Dyslipidemia hypertension- -   -  - -                                   (-) CAD, CHF, arrhythmias and valvular problems/murmurs   METS/Exercise Tolerance:     Hematologic:       Musculoskeletal:       GI/Hepatic:     (+) GERD,            liver disease (Hemochromatosis),       Renal/Genitourinary:       Endo:     (+)  type II DM,       Diabetic complications: neuropathy.      Obesity,       Psychiatric/Substance Use:       Infectious Disease:       Malignancy:       Other:      (+)  , H/O Chronic Pain,         Physical Exam    Airway        Mallampati: II   TM distance: > 3 FB    Mouth opening: > 3 cm    Respiratory Devices and Support         Dental       (+) Multiple visibly decayed, broken teeth      Cardiovascular   cardiovascular exam normal          Pulmonary   pulmonary exam normal            Other findings: No lab results found.   No lab results found.      OUTSIDE LABS:  CBC: No results found for: \"WBC\", \"HGB\", \"HCT\", \"PLT\"  BMP: No results found for: \"NA\", \"POTASSIUM\", \"CHLORIDE\", \"CO2\", \"BUN\", \"CR\", \"GLC\"  COAGS: No results found for: \"PTT\", \"INR\", " "\"FIBR\"  POC: No results found for: \"BGM\", \"HCG\", \"HCGS\"  HEPATIC: No results found for: \"ALBUMIN\", \"PROTTOTAL\", \"ALT\", \"AST\", \"GGT\", \"ALKPHOS\", \"BILITOTAL\", \"BILIDIRECT\", \"TAJ\"  OTHER: No results found for: \"PH\", \"LACT\", \"A1C\", \"RAFAELA\", \"PHOS\", \"MAG\", \"LIPASE\", \"AMYLASE\", \"TSH\", \"T4\", \"T3\", \"CRP\", \"SED\"    Anesthesia Plan    ASA Status:  3       Anesthesia Type: General.     - Airway: ETT   Induction: Propofol.   Maintenance: Balanced.   Techniques and Equipment:     - Airway: Video-Laryngoscope       Consents    Anesthesia Plan(s) and associated risks, benefits, and realistic alternatives discussed. Questions answered and patient/representative(s) expressed understanding.     - Discussed: Risks, Benefits and Alternatives for the PROCEDURE were discussed     - Discussed with:  Patient      - Extended Intubation/Ventilatory Support Discussed: No.      - Patient is DNR/DNI Status: No     Use of blood products discussed: No .     Postoperative Care    Pain management: IV analgesics, Oral pain medications.   PONV prophylaxis: Ondansetron (or other 5HT-3), Background Propofol Infusion     Comments:    Other Comments: Helder Velasco MD    I have reviewed the pertinent notes and labs in the chart from the past 30 days and (re)examined the patient.  Any updates or changes from those notes are reflected in this note.             # Drug Induced Platelet Defect: home medication list includes an antiplatelet medication  # Obesity: Estimated body mass index is 38.88 kg/m  as calculated from the following:    Height as of this encounter: 1.93 m (6' 4\").    Weight as of this encounter: 144.9 kg (319 lb 6.4 oz).      "

## 2023-12-18 NOTE — PROGRESS NOTES
Blood sugar post op is 237.  Notified Dr. Velasco and no treatment ordered at this time.  Mulu Barclay RN on 12/18/2023 at 10:56 AM

## 2023-12-18 NOTE — INTERVAL H&P NOTE
"I have reviewed the surgical (or preoperative) H&P that is linked to this encounter, and examined the patient. There are no significant changes    Clinical Conditions Present on Arrival:  Clinically Significant Risk Factors Present on Admission                 # Drug Induced Platelet Defect: home medication list includes an antiplatelet medication  # Obesity: Estimated body mass index is 38.88 kg/m  as calculated from the following:    Height as of this encounter: 1.93 m (6' 4\").    Weight as of this encounter: 144.9 kg (319 lb 6.4 oz).       "

## 2023-12-18 NOTE — PROVIDER NOTIFICATION
Page sent to RENAE Shaw with Dr. Diaz to clarify that no post-op antibiotics are needed.  Page also sent to hospitalist regarding blood sugar management.     Addendum 1700: Received message from Valeria-no post-op antibiotics needed per Dr. Diaz

## 2023-12-18 NOTE — PROVIDER NOTIFICATION
Patient still reporting increased pain levels-too soon for IV dilaudid. Call placed to Kelin Adrian NP with pain team to discuss.  Does not want to give IV dilaudid this early-try Robaxin first and then can give Atarax an hour later if needed.   Patient's blood pressure elevated, but pain control is an issue currently. Page sent to hospitalist to notify and request any PRN blood pressure meds.     Addendum 1705: Received order for PRN Labetolol.

## 2023-12-18 NOTE — ANESTHESIA CARE TRANSFER NOTE
Patient: Francisco Cortés    Procedure: Procedure(s):  Lumbar four to lumbar five interbody and posterolateral fusion minimally invasive       Diagnosis: Spinal stenosis, lumbar region, without neurogenic claudication [M48.061]  Spondylolisthesis of lumbar region [M43.16]  Diagnosis Additional Information: No value filed.    Anesthesia Type:   General     Note:    Oropharynx: oropharynx clear of all foreign objects and spontaneously breathing  Level of Consciousness: drowsy  Oxygen Supplementation: face mask  Level of Supplemental Oxygen (L/min / FiO2): 8  Independent Airway: airway patency satisfactory and stable  Dentition: dentition unchanged  Vital Signs Stable: post-procedure vital signs reviewed and stable  Report to RN Given: handoff report given  Patient transferred to: PACU    Handoff Report: Identifed the Patient, Identified the Reponsible Provider, Reviewed the pertinent medical history, Discussed the surgical course, Reviewed Intra-OP anesthesia mangement and issues during anesthesia, Set expectations for post-procedure period and Allowed opportunity for questions and acknowledgement of understanding      Vitals:  Vitals Value Taken Time   /74 12/18/23 1010   Temp     Pulse 85 12/18/23 1012   Resp 16 12/18/23 1012   SpO2 94 % 12/18/23 1012   Vitals shown include unfiled device data.    Electronically Signed By: ADA Maloney CRNA  December 18, 2023  10:13 AM

## 2023-12-18 NOTE — CONSULTS
[facility] ACUTE PAIN SERVICE CONSULTATION     Date of Admission:  12/18/2023  Date of Consult (When I saw the patient): 12/18/23     Assessment/Plan:     Francisco Cortés is a 53 year old male who was admitted on 12/18/2023.  Pain team was asked to see the patient for post op pain. Admitted for planned procedure. History of septic right knee joint status post I&D total knee replacement 10/4, cellulitis of left foot, chronic low back pain, chronic osteomyelitis, chronic pain of both lower extremities, degenerative disc disease, diabetes mellitus, diabetic peripheral neuropathy, diabetic ulcer of the left foot, hypertension, obesity, lumbar radiculopathy, polyneuropathy, obesity.  Describes pain as 7-8/10 and aching, sharp. The patient denies nausea, vomiting, constipation, chest pain shortness of breath. The patient reports loose stools with antibiotics. He reports Oxycodone not effective for pain. The patient does not smoke and denies chemical dependency history.     Post op day: Day of Surgery. Lumbar four to lumbar five interbody and posterolateral fusion     PLAN:   1) Pain is consistent with post op pain. Labs and imaging indicated: I have personally reviewed pertinent notes, labs, tests, and radiologic imaging in patient's chart. Treatment plan includes multimodal pain approach, Hospital Medicine Service for medical management, Orthopedics. Patient educated regarding multimodal pain approach, medications as listed below. Patient is understanding of the plan. All questions and concerns addressed to patient's satisfaction.   2)Multimodal Medication Therapy  Topical: None  NSAID'S: None post fusion, he does take Melxociam at home- defer to surgery team when NSAIDs can be resumed  Muscle Relaxants: Robaxin-750 mg every 6 hours as needed -schedule this  Adjuvants: Hydroxyzine 25 mg every 6 hours as needed, Tylenol every 8 hours   Opioids: Oxycodone 10 to 15 mg every 4 hours as needed- stop. OxyContin 20 mg  every 12 hours ordered by surgery team - recommend to discontinue this as long acting opioids not recommended for the treatment of acute postoperative pain. Will order dilaudid 4-6 mg q3h prn   IV Pain medication: Dilaudid 0.2-0.4 mg every 4 hours as needed  3)Non-medication interventions: Ice, rest, PT, OT  4)Constipation Prophylaxis: Scheduled and prn    -MN  pulled from system on 12/18/23. This indicates 11 prescriptions for oxycodone since 6/19/2023.  1 prescription for tramadol in June 2023.  12/7/23 testosterone gel pump  11/14/23 oxycodone 5 mg #30 for 4 days by Keaton Stewart-same on 11/3/2023  10/27/2023 oxycodone 5 mg #40 for 10 days filled by Sarah Solares MD   10/16/2023 oxycodone 5 mg #40 filled by Todd Holter  Discharge Recommendations - We recommend prescribing the following at the time of discharge: per surgery     History of Present Illness (HPI):       Francisco Cortés is a 53 year old male who presented for planned procedure.  Past medical history as above. The pain is reported to be acute, chronic, located in the low back, and it does not radiate.  Current pain is rated at 7-8/10 and goal is 2-4/10.      Per MN  review, the patient does have an opioid tolerance. Opioid induced side effects noted and include: none    Reviewed medical record, labs, imaging, ED note, and care everywhere.     Past pain treatments have included: Oxycodone, Tylenol, tramadol    Visit/Communication Style   Visit/Communication Style   Virtual (Video) communication was used to evaluate Francisco.  Francisco consented to the use of video communication.  Video START time: 1455, 12/18/2023  Video STOP time: 1340, 12/18/2023   Patient's location: [facility]  Provider's location during the visit: [facility]              Medical History   PAST MEDICAL HISTORY:   Past Medical History:   Diagnosis Date    Arthritis     Diabetes (H)     Hypertension     Obese     Other chronic pain         PAST SURGICAL HISTORY:   Past Surgical History:   Procedure Laterality Date    ARTHROPLASTY REVISION KNEE Right 10/04/2023    ARTHROSCOPY KNEE INCISION AND DRAINAGE Right 2023    HAND SURGERY Left     TOTAL HIP ARTHROPLASTY Right 2014    TOTAL HIP ARTHROPLASTY Left 2014    TOTAL KNEE ARTHROPLASTY Right 2023       FAMILY HISTORY: History reviewed.     SOCIAL HISTORY:   Social History     Tobacco Use    Smoking status: Former     Types: Cigars     Quit date:      Years since quittin.9    Smokeless tobacco: Never   Substance Use Topics    Alcohol use: Yes     Comment: occ        [facility]  Tobacco:  reports that he quit smoking about 33 years ago. His smoking use included cigars. He has never used smokeless tobacco.  Alcohol:  reports current alcohol use.  Illicit drugs:  reports no history of drug use.    Allergies  Allergies   Allergen Reactions    Nuts Anaphylaxis     Only Tree nuts such as Walnuts and Pecans per pt   Tongue and throat swelling    Pregabalin Other (See Comments)     Suicidal idealation per patient    Gabapentin Other (See Comments)     Suicidal ideation    Tramadol Itching       Problem List  Patient Active Problem List    Diagnosis Date Noted    H/O Spinal surgery 2023     Priority: Medium       Prior to Admission Medications   Medications Prior to Admission   Medication Sig Dispense Refill Last Dose    acetaminophen (TYLENOL) 500 MG tablet Take 1,000 mg by mouth every morning   12/15/2023    aspirin 81 MG EC tablet Take 162 mg by mouth daily   2023    atorvastatin (LIPITOR) 10 MG tablet Take 10 mg by mouth daily   Past Week    diphenhydrAMINE (BENADRYL) 50 MG capsule Take 50 mg by mouth nightly as needed   Past Week    doxycycline monohydrate (MONODOX) 100 MG capsule Take 100 mg by mouth 2 times daily   2023 at hs    dulaglutide (TRULICITY) 1.5 MG/0.5ML pen Inject 1.5 mg Subcutaneous every 7 days   2023     "empagliflozin (JARDIANCE) 10 MG TABS tablet Take 10 mg by mouth daily   12/14/2023    famotidine (PEPCID) 20 MG tablet Take 20 mg by mouth 2 times daily as needed   More than a month    lisinopril (ZESTRIL) 40 MG tablet Take 40 mg by mouth daily   Past Week    meloxicam (MOBIC) 7.5 MG tablet Take 7.5 mg by mouth daily   Past Week    naproxen sodium (ANAPROX) 220 MG tablet Take 220 mg by mouth daily (with breakfast)   Past Week    rifampin (RIFADIN) 300 MG capsule Take 300 mg by mouth 2 times daily   12/17/2023 at hs    sildenafil (REVATIO) 20 MG tablet Take  mg by mouth as needed   Past Month    testosterone (ANDROGEL 1 % PUMP) 12.5 MG/ACT (1%) gel Place 2 Pump onto the skin daily Admin #1 pump topically to each shoulder   Unknown    vitamin B-Complex Take 1 tablet by mouth daily   Past Week    Vitamin D3 (VITAMIN D, CHOLECALCIFEROL,) 25 mcg (1000 units) tablet Take 1 tablet by mouth daily   Past Week       Review of Systems  Complete ROS reviewed, unless noted in HPI, all other systems reviewed (with patient) and all others found to be negative.      Objective:     Physical Exam:  /78   Pulse 82   Temp 97.9  F (36.6  C)   Resp 18   Ht 1.93 m (6' 4\")   Wt 144.9 kg (319 lb 6.4 oz)   SpO2 98%   BMI 38.88 kg/m    Weight:   Vitals:    11/20/23 0700 12/07/23 0800 12/18/23 0546   Weight: 146.1 kg (322 lb) 143.8 kg (317 lb) 144.9 kg (319 lb 6.4 oz)      Body mass index is 38.88 kg/m .      Exam Limited: telemedicine visit  General Appearance:  Alert, cooperative, no distress, resting in bed    Head:  Normocephalic, without obvious abnormality, atraumatic   Eyes:  PERRL, conjunctiva/corneas clear   ENT/Throat: Lips, mucosa, and tongue normal; teeth and gums normal   Lymph/Neck: Supple, symmetrical, trachea midline   Lungs:   NC O2, respirations unlabored   Abdomen:   Soft, non-tender, bowel sounds active all four quadrants,  no masses, no organomegaly   Musculoskeletal: Extremities normal, atraumatic "   Neurologic: Alert and oriented X 3, Moves all 4 extremities     Psych: Affect is appropriate      Imaging: Reviewed I have personally reviewed pertinent labs, tests, and radiologic imaging in patient's chart.    Labs: Reviewed I have personally reviewed pertinent labs, tests, and radiologic imaging in patient's chart.        Total time spent 65 minutes with greater than 50% in consultation, education and coordination of care.   Elements of Medical Decision Making as described above. High level of decision making required due to 1 or more chronic illness with severe exacerbation, progression, and side effects of treatment.  Acute or chronic illness or injury or surgery. High risk therapy including opioids, high risk drug therapy including oral and/or parenteral controlled substances.     Thank you for this consultation.    MARILYN ChaconP-C  Acute Care Pain Management [facility]   Monday-Friday 8a-4p   Page via online paging system or [employer]

## 2023-12-18 NOTE — BRIEF OP NOTE
[facility] Brief Operative Note    Pre-operative diagnosis: Spinal stenosis, lumbar region, without neurogenic claudication [M48.061]  Spondylolisthesis of lumbar region [M43.16]   Post-operative diagnosis L4 L5 degenerative spondylolisthesis and spinal stenosis     Procedure: Procedure(s):  Lumbar four to lumbar five interbody and posterolateral fusion minimally invasive   Surgeon(s): Surgeon(s) and Role:     * Yovany Diaz MD - Primary     * Valeria Alicia PA-C - Assisting   Estimated blood loss: 25 mL    Specimens: * No specimens in log *   Findings: See op note

## 2023-12-18 NOTE — CONSULTS
"[facility]  Consult Note - Hospitalist Service  Date of Admission:  12/18/2023  Consult Requested by: Dr. Diaz  Reason for Consult: Medical management     Assessment & Plan   Francisco Cortés is a 53 year old male with a past medical history significant for obesity, type II DM, hypertension, septic right knee joint status post I&D total knee replacement 10/4, chronic lower back pain and bilateral sciatica  who was admitted to the hospital for an elective L4-L5 interbody and posterior lateral fusion.  Hospital medicine was consulted for the medical management.    Chronic lower back pain and bilateral sciatica  Status post L4-L5 interbody and posterior lateral fusion  -Orthopedics is primary  -Pain management and anticoagulation per primary  -PT/OT per primary    Recent right knee I&D and total knee replacement  -No acute symptoms, patient follows orthopedics and infectious disease outpatient.   -Patient was advised to continue anticoagulation for 6 weeks postsurgery she has completed.  -Further anticoagulation per spine.  -Prior to admission was on doxycycline and rifampin  -Will resume doxycycline and rifampin    Type II DM complicated by neuropathy  -Most recent A1c was 5.6 in 9/20/2023  -Prior to admission was on Trulicity and Jardiance  -Resume Jardiance if creatinine is okay.  BMP ordered  -Start medium resistant sliding scale insulin  -Goal blood glucose 1 40-1 80    Hypertension  -Prior to admission was on lisinopril  -Resume lisinopril if creatinine is okay.  BMP ordered     Clinically Significant Risk Factors Present on Admission                # Drug Induced Platelet Defect: home medication list includes an antiplatelet medication   # Hypertension: Home medication list includes antihypertensive(s)      # Obesity: Estimated body mass index is 38.88 kg/m  as calculated from the following:    Height as of this encounter: 1.93 m (6' 4\").    Weight as of this encounter: 144.9 kg (319 lb " 6.4 oz).              Cassia Orr MD  Hospitalist Service  Securely message with [employer] (more info)  Text page via [facility] Paging/Directory   ______________________________________________________________________    Chief Complaint   Status post L4-L5 fusion    History is obtained from the patient    History of Present Illness     Francisco Cortés is a 53 year old male with a past medical history significant for obesity, type II DM, hypertension, septic right knee joint status post I&D total knee replacement 10/4, chronic lower back pain and bilateral sciatica  who was admitted to the hospital for an elective L4-L5 interbody and posterior lateral fusion.  Hospital medicine was consulted for the medical management.    Patient was seen after the surgery.  He was laying still in bed.  He states that he feels uncomfortable but thinks that cold packs is helping.  He denied any recent fever or chills.  No difficulty breathing, chest pain, abdominal pain, difficulty urinating.  Patient has bilateral lower extremity neuropathy which she thinks is at his baseline.  No new lower extremity symptoms or weakness.  He has recent right total knee arthroplasty.  He states that his knee feels fine.  He denied any increasing swelling or pain.      Past Medical History    Past Medical History:   Diagnosis Date    Arthritis     Diabetes (H)     Hypertension     Obese     Other chronic pain        Past Surgical History   Past Surgical History:   Procedure Laterality Date    ARTHROPLASTY REVISION KNEE Right 10/04/2023    ARTHROSCOPY KNEE INCISION AND DRAINAGE Right 06/14/2023    HAND SURGERY Left 2001    TOTAL HIP ARTHROPLASTY Right 01/14/2014    TOTAL HIP ARTHROPLASTY Left 02/25/2014    TOTAL KNEE ARTHROPLASTY Right 05/01/2023       Medications   I have reviewed this patient's current medications       Review of Systems    The 10 point Review of Systems is negative other than noted in the HPI or here.      Physical Exam   Vital Signs:  Temp: 97.9  F (36.6  C) Temp src: Temporal BP: 133/78 Pulse: 82   Resp: 18 SpO2: 98 % O2 Device: Nasal cannula Oxygen Delivery: 2 LPM  Weight: 319 lbs 6.4 oz    Constitutional: awake, alert, cooperative, no apparent distress, and appears stated age  Eyes: Anicteric sclera  ENT: normocepalic, without obvious abnormality  Respiratory: No increased work of breathing, good air exchange, clear to auscultation bilaterally, no crackles or wheezing  Cardiovascular: Normal rate, regular rhythm, no murmur  GI: Obese, soft, nontender, nondistended  Skin: Supple skin appears within normal limit  MSK: No lower extremity edema.  Right TKA incision, appears clean and healed.   Neuro: Alert and oriented x 3, no focal deficit.    Medical Decision Making       59 MINUTES SPENT BY ME on the date of service doing chart review, history, exam, documentation & further activities per the note.      Data
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
## 2023-12-19 ENCOUNTER — APPOINTMENT (OUTPATIENT)
Dept: PHYSICAL THERAPY | Facility: CLINIC | Age: 53
DRG: 455 | End: 2023-12-19
Payer: COMMERCIAL

## 2023-12-19 LAB
ANION GAP SERPL CALCULATED.3IONS-SCNC: 11 MMOL/L (ref 7–15)
BASOPHILS # BLD AUTO: 0 10E3/UL (ref 0–0.2)
BASOPHILS NFR BLD AUTO: 0 %
BUN SERPL-MCNC: 9.9 MG/DL (ref 6–20)
CALCIUM SERPL-MCNC: 8.6 MG/DL (ref 8.6–10)
CHLORIDE SERPL-SCNC: 103 MMOL/L (ref 98–107)
CREAT SERPL-MCNC: 0.67 MG/DL (ref 0.67–1.17)
DEPRECATED HCO3 PLAS-SCNC: 24 MMOL/L (ref 22–29)
EGFRCR SERPLBLD CKD-EPI 2021: >90 ML/MIN/1.73M2
EOSINOPHIL # BLD AUTO: 0 10E3/UL (ref 0–0.7)
EOSINOPHIL NFR BLD AUTO: 0 %
ERYTHROCYTE [DISTWIDTH] IN BLOOD BY AUTOMATED COUNT: 15.2 % (ref 10–15)
GLUCOSE BLDC GLUCOMTR-MCNC: 171 MG/DL (ref 70–99)
GLUCOSE BLDC GLUCOMTR-MCNC: 179 MG/DL (ref 70–99)
GLUCOSE BLDC GLUCOMTR-MCNC: 180 MG/DL (ref 70–99)
GLUCOSE BLDC GLUCOMTR-MCNC: 190 MG/DL (ref 70–99)
GLUCOSE BLDC GLUCOMTR-MCNC: 201 MG/DL (ref 70–99)
GLUCOSE SERPL-MCNC: 177 MG/DL (ref 70–99)
GLUCOSE SERPL-MCNC: 177 MG/DL (ref 70–99)
HCT VFR BLD AUTO: 45 % (ref 40–53)
HGB BLD-MCNC: 14.9 G/DL (ref 13.3–17.7)
IMM GRANULOCYTES # BLD: 0 10E3/UL
IMM GRANULOCYTES NFR BLD: 0 %
LYMPHOCYTES # BLD AUTO: 1.2 10E3/UL (ref 0.8–5.3)
LYMPHOCYTES NFR BLD AUTO: 10 %
MCH RBC QN AUTO: 27.8 PG (ref 26.5–33)
MCHC RBC AUTO-ENTMCNC: 33.1 G/DL (ref 31.5–36.5)
MCV RBC AUTO: 84 FL (ref 78–100)
MONOCYTES # BLD AUTO: 1.3 10E3/UL (ref 0–1.3)
MONOCYTES NFR BLD AUTO: 12 %
NEUTROPHILS # BLD AUTO: 8.7 10E3/UL (ref 1.6–8.3)
NEUTROPHILS NFR BLD AUTO: 78 %
NRBC # BLD AUTO: 0 10E3/UL
NRBC BLD AUTO-RTO: 0 /100
PLATELET # BLD AUTO: 159 10E3/UL (ref 150–450)
POTASSIUM SERPL-SCNC: 4.3 MMOL/L (ref 3.4–5.3)
RBC # BLD AUTO: 5.36 10E6/UL (ref 4.4–5.9)
SODIUM SERPL-SCNC: 138 MMOL/L (ref 135–145)
WBC # BLD AUTO: 11.3 10E3/UL (ref 4–11)

## 2023-12-19 PROCEDURE — 120N000001 HC R&B MED SURG/OB

## 2023-12-19 PROCEDURE — 250N000011 HC RX IP 250 OP 636: Performed by: CLINICAL NURSE SPECIALIST

## 2023-12-19 PROCEDURE — 250N000013 HC RX MED GY IP 250 OP 250 PS 637: Performed by: STUDENT IN AN ORGANIZED HEALTH CARE EDUCATION/TRAINING PROGRAM

## 2023-12-19 PROCEDURE — 250N000013 HC RX MED GY IP 250 OP 250 PS 637: Performed by: CLINICAL NURSE SPECIALIST

## 2023-12-19 PROCEDURE — 250N000013 HC RX MED GY IP 250 OP 250 PS 637: Performed by: NURSE PRACTITIONER

## 2023-12-19 PROCEDURE — 250N000013 HC RX MED GY IP 250 OP 250 PS 637: Performed by: INTERNAL MEDICINE

## 2023-12-19 PROCEDURE — 250N000013 HC RX MED GY IP 250 OP 250 PS 637

## 2023-12-19 PROCEDURE — 80048 BASIC METABOLIC PNL TOTAL CA: CPT

## 2023-12-19 PROCEDURE — 97530 THERAPEUTIC ACTIVITIES: CPT | Mod: GP

## 2023-12-19 PROCEDURE — 250N000013 HC RX MED GY IP 250 OP 250 PS 637: Performed by: HOSPITALIST

## 2023-12-19 PROCEDURE — 250N000011 HC RX IP 250 OP 636: Mod: JZ | Performed by: NURSE PRACTITIONER

## 2023-12-19 PROCEDURE — 99232 SBSQ HOSP IP/OBS MODERATE 35: CPT | Performed by: INTERNAL MEDICINE

## 2023-12-19 PROCEDURE — 85025 COMPLETE CBC W/AUTO DIFF WBC: CPT

## 2023-12-19 PROCEDURE — 36415 COLL VENOUS BLD VENIPUNCTURE: CPT

## 2023-12-19 PROCEDURE — 97161 PT EVAL LOW COMPLEX 20 MIN: CPT | Mod: GP

## 2023-12-19 PROCEDURE — 99233 SBSQ HOSP IP/OBS HIGH 50: CPT | Performed by: CLINICAL NURSE SPECIALIST

## 2023-12-19 RX ORDER — METHOCARBAMOL 500 MG/1
1000 TABLET, FILM COATED ORAL EVERY 6 HOURS
Status: DISCONTINUED | OUTPATIENT
Start: 2023-12-19 | End: 2023-12-21 | Stop reason: HOSPADM

## 2023-12-19 RX ORDER — DIPHENHYDRAMINE HCL 25 MG
25 CAPSULE ORAL EVERY 6 HOURS PRN
Status: DISCONTINUED | OUTPATIENT
Start: 2023-12-19 | End: 2023-12-21 | Stop reason: HOSPADM

## 2023-12-19 RX ORDER — OXYCODONE HYDROCHLORIDE 10 MG/1
10 TABLET ORAL
Status: DISCONTINUED | OUTPATIENT
Start: 2023-12-19 | End: 2023-12-19

## 2023-12-19 RX ORDER — OXYCODONE HYDROCHLORIDE 5 MG/1
10 TABLET ORAL EVERY 4 HOURS PRN
Qty: 84 TABLET | Refills: 0 | Status: SHIPPED | OUTPATIENT
Start: 2023-12-19 | End: 2023-12-21

## 2023-12-19 RX ORDER — OXYCODONE HYDROCHLORIDE 10 MG/1
10 TABLET ORAL EVERY 4 HOURS PRN
Status: DISCONTINUED | OUTPATIENT
Start: 2023-12-19 | End: 2023-12-20

## 2023-12-19 RX ORDER — LISINOPRIL 40 MG/1
40 TABLET ORAL DAILY
Status: DISCONTINUED | OUTPATIENT
Start: 2023-12-19 | End: 2023-12-21 | Stop reason: HOSPADM

## 2023-12-19 RX ORDER — METHOCARBAMOL 750 MG/1
750 TABLET, FILM COATED ORAL 4 TIMES DAILY PRN
Qty: 30 TABLET | Refills: 1 | Status: SHIPPED | OUTPATIENT
Start: 2023-12-19 | End: 2024-01-02

## 2023-12-19 RX ADMIN — HYDROMORPHONE HYDROCHLORIDE 6 MG: 2 TABLET ORAL at 01:06

## 2023-12-19 RX ADMIN — RIFAMPIN 300 MG: 300 CAPSULE ORAL at 08:17

## 2023-12-19 RX ADMIN — OXYCODONE HYDROCHLORIDE 15 MG: 10 TABLET ORAL at 12:48

## 2023-12-19 RX ADMIN — INSULIN ASPART 2 UNITS: 100 INJECTION, SOLUTION INTRAVENOUS; SUBCUTANEOUS at 17:21

## 2023-12-19 RX ADMIN — OXYCODONE HYDROCHLORIDE 15 MG: 10 TABLET ORAL at 17:20

## 2023-12-19 RX ADMIN — ACETAMINOPHEN 975 MG: 325 TABLET, FILM COATED ORAL at 19:10

## 2023-12-19 RX ADMIN — HYDROXYZINE HYDROCHLORIDE 25 MG: 25 TABLET, FILM COATED ORAL at 10:44

## 2023-12-19 RX ADMIN — HYDROMORPHONE HYDROCHLORIDE 6 MG: 2 TABLET ORAL at 07:39

## 2023-12-19 RX ADMIN — INSULIN ASPART 1 UNITS: 100 INJECTION, SOLUTION INTRAVENOUS; SUBCUTANEOUS at 11:46

## 2023-12-19 RX ADMIN — METHOCARBAMOL 1000 MG: 500 TABLET ORAL at 11:47

## 2023-12-19 RX ADMIN — INSULIN ASPART 1 UNITS: 100 INJECTION, SOLUTION INTRAVENOUS; SUBCUTANEOUS at 08:10

## 2023-12-19 RX ADMIN — DOXYCYCLINE HYCLATE 100 MG: 100 CAPSULE ORAL at 08:17

## 2023-12-19 RX ADMIN — RIFAMPIN 300 MG: 300 CAPSULE ORAL at 21:09

## 2023-12-19 RX ADMIN — OXYCODONE HYDROCHLORIDE 15 MG: 10 TABLET ORAL at 21:10

## 2023-12-19 RX ADMIN — HYDROXYZINE HYDROCHLORIDE 25 MG: 25 TABLET, FILM COATED ORAL at 23:33

## 2023-12-19 RX ADMIN — METHOCARBAMOL 1000 MG: 500 TABLET ORAL at 17:20

## 2023-12-19 RX ADMIN — METHOCARBAMOL TABLETS 750 MG: 750 TABLET, COATED ORAL at 06:15

## 2023-12-19 RX ADMIN — HYDROMORPHONE HYDROCHLORIDE 1 MG: 1 INJECTION, SOLUTION INTRAMUSCULAR; INTRAVENOUS; SUBCUTANEOUS at 10:43

## 2023-12-19 RX ADMIN — HYDROXYZINE HYDROCHLORIDE 25 MG: 25 TABLET, FILM COATED ORAL at 03:29

## 2023-12-19 RX ADMIN — HYDROMORPHONE HYDROCHLORIDE 0.2 MG: 0.2 INJECTION, SOLUTION INTRAMUSCULAR; INTRAVENOUS; SUBCUTANEOUS at 23:37

## 2023-12-19 RX ADMIN — HYDROMORPHONE HYDROCHLORIDE 0.2 MG: 0.2 INJECTION, SOLUTION INTRAMUSCULAR; INTRAVENOUS; SUBCUTANEOUS at 19:07

## 2023-12-19 RX ADMIN — HYDROXYZINE HYDROCHLORIDE 25 MG: 25 TABLET, FILM COATED ORAL at 17:20

## 2023-12-19 RX ADMIN — DOXYCYCLINE HYCLATE 100 MG: 100 CAPSULE ORAL at 21:10

## 2023-12-19 RX ADMIN — HYDROMORPHONE HYDROCHLORIDE 0.4 MG: 0.2 INJECTION, SOLUTION INTRAMUSCULAR; INTRAVENOUS; SUBCUTANEOUS at 02:09

## 2023-12-19 RX ADMIN — ACETAMINOPHEN 975 MG: 325 TABLET, FILM COATED ORAL at 02:30

## 2023-12-19 RX ADMIN — LISINOPRIL 40 MG: 40 TABLET ORAL at 14:57

## 2023-12-19 RX ADMIN — DIPHENHYDRAMINE HYDROCHLORIDE 25 MG: 25 CAPSULE ORAL at 04:50

## 2023-12-19 RX ADMIN — METHOCARBAMOL 1000 MG: 500 TABLET ORAL at 23:33

## 2023-12-19 RX ADMIN — ACETAMINOPHEN 975 MG: 325 TABLET, FILM COATED ORAL at 10:44

## 2023-12-19 RX ADMIN — ATORVASTATIN CALCIUM 10 MG: 10 TABLET, FILM COATED ORAL at 08:17

## 2023-12-19 RX ADMIN — HYDROMORPHONE HYDROCHLORIDE 6 MG: 2 TABLET ORAL at 04:27

## 2023-12-19 ASSESSMENT — ACTIVITIES OF DAILY LIVING (ADL)
ADLS_ACUITY_SCORE: 22
ADLS_ACUITY_SCORE: 22
ADLS_ACUITY_SCORE: 27
ADLS_ACUITY_SCORE: 22
ADLS_ACUITY_SCORE: 22
ADLS_ACUITY_SCORE: 26
ADLS_ACUITY_SCORE: 27
ADLS_ACUITY_SCORE: 22
ADLS_ACUITY_SCORE: 27
ADLS_ACUITY_SCORE: 22

## 2023-12-19 ASSESSMENT — ABNORMAL INVOLUNTARY MOVEMENT SCALE (AIMS)
UPPER_BODY_EXTREMITIES: NONE, NORMAL
LOWER_BODY_EXTREMITIES: NONE, NORMAL

## 2023-12-19 NOTE — PLAN OF CARE
Goal Outcome Evaluation:      Plan of Care Reviewed With: patient    Overall Patient Progress: improvingOverall Patient Progress: improving    Afeb, vss, dressing clean and dry to his back, numbness both feet to the knees is chronic o/w cms and n/v intact. Voided well in urinal, good intake with no n/v. Severe pain in the morning so pain team called to see pt again. Ordered a 1mg dose of IV Dilaudid then changed him back to oxycodone which he found he preferred to the po dilaudid. Was then able to get up out of bed with therapy and ambulate around the room. Pain down to a 6. Blood sugars 170's covered with sliding scale  Plans to go home with family's help on discharge.

## 2023-12-19 NOTE — PROGRESS NOTES
[facility]  Hospitalist Progress Note  Muuknd Flanagan M.D., M.B.A.   12/19/2023    Reason for Stay/active problem list    Chronic lower back pain and bilateral sciatica Status post L4-L5 interbody and posterior lateral fusion         Assessment and Plan:        Summary of Stay: Francisco Cortés is a 53 year old male with a past medical history significant for obesity, type II DM, hypertension, septic right knee joint status post I&D total knee replacement 10/4, chronic lower back pain and bilateral sciatica  who was admitted to the hospital for an elective L4-L5 interbody and posterior lateral fusion.  Hospital medicine was consulted for the medical management.   Problem List with Assessment and Plan:    Chronic lower back pain and bilateral sciatica  Status post L4-L5 interbody and posterior lateral fusion  -Patient has been complaining of severe pain.  Pain management team is consulted and managing patient's pain.  --Currently patient's pain control is not optimal.  Charge nurse was notified to connect with pain management team to address pain control medications.  Further postop care is deferred to primary team     Recent right knee I&D and total knee replacement  -No acute symptoms, patient follows orthopedics and infectious disease outpatient.   -Patient was advised to continue anticoagulation for 6 weeks postsurgery she has completed.  -Further anticoagulation per spine.  -Prior to admission was on doxycycline and rifampin  -Resumed doxycycline and rifampin     Type II DM complicated by neuropathy  -Most recent A1c was 5.6 in 9/20/2023  -Prior to admission was on Trulicity and Jardiance  -Resumed Jardiance  -Started medium resistant sliding scale insulin    Hypertension  -Prior to admission was on lisinopril  -Resumed lisinopril         Plan for today:  Pain team to assist with further pain management recommendations      VTE Prophylaxis: Defer to primary service  Code Status: Full  "Code  Diet: Advance Diet as Tolerated: Regular Diet Adult  Diet    Figueroa Catheter: Not present         Disposition: Per primary team pending pain control      Interval History (Subjective):        Patient is seen and examined by me today and medical record reviewed.Overnight events noted and care discussed with nursing staff.  Patient care assumed by her this morning.  He is complaining of severe pain.  Care plan discussed with bedside nurse and charge nurse to reach out to pain management team to assist with further medication recommendations.         Physical Exam:        Last Vital Signs:  /59 (BP Location: Left arm)   Pulse 94   Temp 98.3  F (36.8  C) (Temporal)   Resp 18   Ht 1.93 m (6' 4\")   Wt 144.9 kg (319 lb 6.4 oz)   SpO2 92%   BMI 38.88 kg/m      I/O last 3 completed shifts:  In: 1820 [P.O.:620; I.V.:1200]  Out: 2655 [Urine:2630; Blood:25]    Wt Readings from Last 5 Encounters:   12/18/23 144.9 kg (319 lb 6.4 oz)        Constitutional: Awake, alert, cooperative, no apparent distress     Respiratory: Clear to auscultation bilaterally, no crackles or wheezing   Cardiovascular: Regular rate and rhythm, normal S1 and S2, and no murmur noted   Abdomen: Normal bowel sounds, soft, non-distended, non-tender   Skin: No new rashes, no cyanosis, dry to touch   Neuro: Alert with  no new focal weakness   Extremities: No edema   Other(s):        All other systems: Negative          Medications:        All current medications were reviewed with changes reflected in problem list.         Data:      All new lab and imaging data was reviewed.      Data reviewed today: I reviewed all new labs and imaging results over the last 24 hours. I personally reviewed       Recent Labs   Lab 12/19/23  0641 12/18/23  0633   WBC 11.3*  --    HGB 14.9 15.7   HCT 45.0  --    MCV 84  --      --      No results for input(s): \"CULT\" in the last 168 hours.  Recent Labs   Lab 12/19/23  1131 12/19/23  0646 12/19/23  0641 " "  NA  --   --  138   POTASSIUM  --   --  4.3   CHLORIDE  --   --  103   CO2  --   --  24   ANIONGAP  --   --  11   * 180* 177*  177*   BUN  --   --  9.9   CR  --   --  0.67   GFRESTIMATED  --   --  >90   RAFAELA  --   --  8.6       Recent Labs   Lab 12/19/23  1131 12/19/23  0646 12/19/23  0641 12/19/23  0205 12/18/23  2117   * 180* 177*  177* 179* 266*       No results for input(s): \"INR\" in the last 168 hours.      No results for input(s): \"TROPONIN\", \"TROPI\", \"TROPR\" in the last 168 hours.    Invalid input(s): \"TROP\", \"TROPONINIES\"    Recent Results (from the past 48 hour(s))   XR Surgery JESSENIA L/T 5 Min Fluoro w Stills    Narrative    This exam was marked as non-reportable because it will not be read by a   radiologist or a [place] non-radiologist provider.             COVID Status:  COVID-19 PCR Results           No data to display              COVID-19 Antibody Results, Testing for Immunity           No data to display                 Disclaimer: This note consists of symbols derived from keyboarding, dictation and/or voice recognition software. As a result, there may be errors in the script that have gone undetected. Please consider this when interpreting information found in this chart.    "

## 2023-12-19 NOTE — PROGRESS NOTES
12/19/23 7135   Appointment Info   Signing Clinician's Name / Credentials (PT) Deb Quarles DPT   Living Environment   People in Home child(fred), adult;spouse   Current Living Arrangements house   Home Accessibility stairs to enter home;stairs within home   Number of Stairs, Main Entrance 2   Stair Railings, Main Entrance none   Number of Stairs, Within Home, Primary four;five   Stair Railings, Within Home, Primary railings safe and in good condition   Transportation Anticipated car, drives self;family or friend will provide   Self-Care   Usual Activity Tolerance moderate   Current Activity Tolerance fair   Equipment Currently Used at Home cane, straight;walker, rolling;walker, standard   Fall history within last six months no   Activity/Exercise/Self-Care Comment Pt reports IND at baseline, furniture walks in house. Uses walker or cane in community. Pt does not work.   General Information   Onset of Illness/Injury or Date of Surgery 12/18/23   Referring Physician Valeria Alicia PA-C   Patient/Family Therapy Goals Statement (PT) Return home   Pertinent History of Current Problem (include personal factors and/or comorbidities that impact the POC) Francisco Cortés is a 53 year old male POD #1 following L4-L5 interbody and posterior lateral fusion, with a past medical history significant for obesity, type II DM, hypertension, septic right knee joint status post I&D total knee replacement 10/4, chronic lower back pain and bilateral sciatica   Existing Precautions/Restrictions spinal   Cognition   Affect/Mental Status (Cognition) WFL   Orientation Status (Cognition) oriented x 4   Follows Commands (Cognition) WFL   Pain Assessment   Patient Currently in Pain   (6/10 low back)   Integumentary/Edema   Integumentary/Edema Comments Incision not observed, covered   Posture    Posture Forward head position;Protracted shoulders   Range of Motion (ROM)   Range of Motion ROM is WFL   Strength (Manual Muscle Testing)    Strength (Manual Muscle Testing) Deficits observed during functional mobility   Bed Mobility   Comment, (Bed Mobility) Supine to sit SBA   Transfers   Comment, (Transfers) Sit to stand CGA   Gait/Stairs (Locomotion)   Comment, (Gait/Stairs) Pt amb w/ FWW and CGA   Balance   Balance Comments Fair seated and standing   Sensory Examination   Sensory Perception patient reports no sensory changes   Clinical Impression   Criteria for Skilled Therapeutic Intervention Yes, treatment indicated   PT Diagnosis (PT) Impaired functional mobility and gait   Influenced by the following impairments Pain, weakness, decreased activity tolerance, impaired balance   Functional limitations due to impairments Limited functional mobility requiring AD and assist   Clinical Presentation (PT Evaluation Complexity) stable   Clinical Presentation Rationale Based on PMH, current status, and social support   Clinical Decision Making (Complexity) low complexity   Planned Therapy Interventions (PT) balance training;bed mobility training;gait training;stair training;strengthening;transfer training;progressive activity/exercise   Risk & Benefits of therapy have been explained evaluation/treatment results reviewed;care plan/treatment goals reviewed;risks/benefits reviewed;current/potential barriers reviewed;participants voiced agreement with care plan;participants included;patient   PT Total Evaluation Time   PT Eval, Low Complexity Minutes (23878) 10   Physical Therapy Goals   PT Frequency 2x/day   PT Predicted Duration/Target Date for Goal Attainment 12/26/23   PT Goals Bed Mobility;Transfers;Gait;Stairs   PT: Bed Mobility Independent;Supine to/from sit;Within precautions   PT: Transfers Modified independent;Sit to/from stand;Assistive device   PT: Gait Modified independent;Assistive device;50 feet   PT: Stairs Supervision/stand-by assist;9 stairs   Interventions   Interventions Quick Adds Therapeutic Activity   Therapeutic Activity   Therapeutic  Activities: dynamic activities to improve functional performance Minutes (57237) 23   Symptoms Noted During/After Treatment Increased pain   Treatment Detail/Skilled Intervention Pt supine in bed upon therapist arrival, agreeable to PT w/ encouragement. Pt reporting pain throughout session. Pt rolled from supine <> sidelying w/ SBA, pt slow moving, VCs to bend knees. Pt sat EOB, able to shift hips forward. Pt performed sit <> stand w/ FWW and CGA. Pt amb 15' around room w/ FWW and CGA. Pt very slow moving, small steps. pt sat EOB, able to shift hips back. Pt transferred to supine w/ SBA. Pt turned to sidelying. Ice packs in place, all needs w/in reach, RN updated and in room.   PT Discharge Planning   PT Plan Bring yana walker. Progress bed mob, transfers, amb. Trial stairs   PT Discharge Recommendation (DC Rec) home with assist   PT Rationale for DC Rec Pt below baseline, currently CGA for mobility. Pt limited by pain. Pt has stairs to navigate at home. Anticipate w/ further IP PT pt will progress and meet goals   PT Brief overview of current status CGA   Total Session Time   Timed Code Treatment Minutes 23   Total Session Time (sum of timed and untimed services) 33

## 2023-12-19 NOTE — DISCHARGE INSTRUCTIONS
Post-Op Instructions    Description of Procedure:  The oblique lateral lumbar interbody fusion (OLLIF) is a minimally invasive surgery that involves multiple small incisions. With x-ray guidance, the surgeon is able to place screws, rods, and cages. The screws are placed in the vertebrae along with rods that go on either side of the spine to stabilize it. A cage is placed between the vertebrae in the disc space after the disc is removed. Bone growth is encouraged by a specialized material in the cage. Bone will grow to connect the vertebrae, fusing them together. It will take several months for the bone growth to occur, so following the post-op restrictions are very important during this time.    The following guidelines are recommended after surgery to ensure a good recovery. You may be given additional instructions by your surgeon when discharged. For questions or concerns, please contact Dr. Diaz at [phone].    After your surgery:   You may resume your regular diet as tolerated; avoiding spicy or greasy foods at first.   Resume all previous medications. Pain medication and antibiotics may cause constipation. Increase your water and fiber intake. Also, over the counter stool softeners may be helpful (Senna, Colace, Milk of Magnesia).   NO ALCOHOL for the next 24 hours or while taking pain medication(s).  It is normal to feel sleepy, dizzy and/or slightly nauseous for 24 hours after anesthesia.  NO driving or operating heavy machinery or make any important or legal decisions or sign legal documents while taking narcotics.    Restrictions:  Following surgery, you will be asked to wear your brace with activity and in the car for the first 3 months. During this period, remember the acronym  BLT.  That is, you should not bend, lift over 8 pounds, or twist. You should try to keep your back (hips to shoulders) as straight as possible. In addition, follow these lifting restrictions:  1 month: 8 lbs  2 months: 16  lbs  4 months: 25 lbs  Further guidance will be provided at your follow up appointment.     Incision Care:  You may have staples, sutures, steri-strips, or likely a combination of these to help keep your incision closed post-operatively. Steri-strips are small stickers placed over the wound; please allow these to remain in place until they fall off on their own, typically about 7 days. Staples should be taken out within 10-14 days after surgery, either by your primary care provider or at our facility. Often a bandage is placed over the incision site. Please keep this bandage on for 48 hours after surgery, as it is a sterile dressing. After 48 hours, the dressing should be changed daily until there is no longer drainage around 5-7 days post-op. After this, gauze or bandages are usually not necessary. We ask that you leave the incision site open to air. Please remember that dark and moist areas promote bacterial growth.     Signs of Infection:  You or a significant other should monitor your incision for signs of infection. Signs of infection include: redness, swelling, increased warmth to touch, discharge from the wound, and a body temperature over 100.5 F. If any signs of infection do appear, please contact our office.     Can I bathe?  It is safe for the incision to get wet while taking a shower, but avoid any direct pressure from the shower head and do not scrub the area. Lightly pat the site dry with a clean towel when finished. Avoid submerging your wound by soaking in the bathtub, as this will increase your risk of infection at the incision site. Please wait at least 3 weeks for the incision wound to heal before submerging the area while taking baths.     When can I go back to work?  Most patients go back to work with restrictions 4-8 weeks after surgery. If your job is more physically demanding, this may need to be extended. In general, your capability to return to work will be discussed at your one-month  visit.    When can I drive?  Once you are off all narcotic pain medications, and you have regained full control of your extremities you should be able to safely operate your vehicle.     Appointments:  Please see your primary care provider 10-14 days after surgery to have the incision examined. We would like to see you at [facility] one month after surgery. This is the first of a series of post-op appointments. You will have a CT scan prior to your appointment to confirm the position of the hardware. You can reach [facility] to set up an appointment, if it is not yet scheduled.     Things to Remember:  Fluctuation in the amount of pain you may experience is normal. This is especially common 1-2 weeks after surgery related to increased swelling at the surgical site.     Certain things can delay your healing, hinder your surgery from being successful, and/or increase your risk of requiring another surgery. These include smoking and/or tobacco use, diabetes, poor general health, advanced age, and/or obesity. Although some risks cannot be avoided, it is important to control what you can for the best possible outcome.    In Emergency Situations, Call 911:  Sudden onset of leg weakness and spasms  Loss of bladder control and/or bowel functions  Signs or symptoms of a pulmonary embolism (PE) - sudden coughing, sharp chest pain, rapid breathing, or shortness of breath, and/or severe lightheadedness.   Signs or symptoms of a deep vein thrombosis (DVT) - Swelling in the leg(s), pain and/or tenderness in the leg(s), red or discolored skin on the leg(s), and/or warm skin to the touch.

## 2023-12-19 NOTE — PROGRESS NOTES
DAILY PROGRESS NOTE    Francisco Cortés is a 53 year old old male admitted on 12/18/2023  5:23 AM.    Subjective  Yogesh presents POD! From a L4-L5 OLLIF on 10/18/23. Today pt notes that he is having pain. His pain is localized to his low back around the incision site. He denies LE symptoms at this time. He reports walking around with staff and is voiding.   He wishes to go home today, but Is having some difficulties managing his pain. I will add Oxycodone PRN back to his management.     Objective    AAOx3, Ambulating, Neuro intact, strength intact    Hemoglobin   Date Value Ref Range Status   12/19/2023 14.9 13.3 - 17.7 g/dL Final   ]      Impression / Plan  PO pain management to prepare for discharge    Plan for today:    Patient doing well from a surgical stand point  Ambulate with help  PT OT, possibly clearance   Full diet  Today  Wean and d/c PCA and transition to PO meds today   Possible discharge pending pain     Valeria Haberer PAC

## 2023-12-19 NOTE — PLAN OF CARE
"Goal Outcome Evaluation:      Plan of Care Reviewed With: patient    Overall Patient Progress: no changeOverall Patient Progress: no change    Patient vital signs are at baseline: Yes  Patient able to ambulate as they were prior to admission or with assist devices provided by therapies during their stay:  Yes  Patient MUST void prior to discharge:  Yes  Patient able to tolerate oral intake:  Yes  Pain has adequate pain control using Oral analgesics:  No- pt having severe pain- gave 1x dose of IV dil 0.4 mg  Does patient have an identified :  Yes  Has goal D/C date and time been discussed with patient:  No,  Reason:  discharge date pending per pain control    Care continued:  - 730     Events this shift: Pt not having adequate pain control with PO medications.    VSS- on RA  /57 (BP Location: Left arm)   Pulse 105   Temp 99.6  F (37.6  C) (Oral)   Resp 20   Ht 1.93 m (6' 4\")   Wt 144.9 kg (319 lb 6.4 oz)   SpO2 95%   BMI 38.88 kg/m       B- 2 units at bedtime, 179  Pain: scheduled Robaxin, scheduled tylenol, PRN atarax, PRN PO dilaudid 6 mg q3h. Gave 1 dose of IV dilaudid. Pain ranged from 6-9/10. Ice applied.  Ambulation: A1 w/GB   Voiding: yes.   Gtts: SL- adequate PO intake. No IV abx per surgeon.  PRN benadryl ordered for itching, q6h PO    Plan: continue to control pain, discharge home when ortho clears pt.       "

## 2023-12-19 NOTE — CONSULTS
Care Management Discharge Note    Discharge Date: 12/20/2023       Discharge Disposition:  home with assist    Discharge Transportation: car, drives self, family or friend will provide    Patient/Family in Agreement with the Plan:  yes    Additional Information:  SW/CM consulted for discharge planning.  Per chart review, patient is going home with assist, no discharge needs identified.  Please contact [facility] if needs arise.    GABE Delgadillo, [facility]  Inpatient Care Coordination  [facility]  [phone]

## 2023-12-19 NOTE — PLAN OF CARE
Goal Outcome Evaluation:      Plan of Care Reviewed With: patient    Overall Patient Progress: no change      Pt arrived from PACU this afternoon-transferred to bed via hovermat.  Painful throughout the evening, will often start hyperventilating.  Is able to be redirected with breathing and distracted with conversation.  Started on PO Dilaudid as well as Robaxin and Atarax.  Dressing D/I to back. CMS at baseline. Sat up on edge of bed this evening with Ax2, pt then became very pale and felt like he was going to pass out.  Returned to bed, B/P 144/87, pt remained alert the entire time.  Feeling better once in bed, repositioned on his back with Ax2. Has taken sips of clear so far, denies nausea. Hoping to discharge home in the next day or two, pending pain control and progress with therapy.

## 2023-12-19 NOTE — PROGRESS NOTES
[facility] ACUTE PAIN SERVICE    ([facility], [facility], [facility], [facility])  Pain Progress Note    Assessment/Plan:  Francisco Cortés is a 53 year old male who was admitted on 12/18/2023.  Pain Service is asked to see the patient for acute post op pain.  Admitted for planned procedure. Post Operative Day #1  L4-L5 interbody and posterior lateral fusion.   History of septic right knee joint, status post I&D,  total knee replacement 10/4, cellulitis of left foot, chronic low back pain, chronic osteomyelitis, chronic pain of both lower extremities, degenerative disc disease, diabetes mellitus, diabetic peripheral neuropathy, diabetic ulcer of the left foot, hypertension, obesity, lumbar radiculopathy, polyneuropathy, obesity.    The patient reports loose stools with antibiotics. He reports Oxycodone not effective for pain. The patient does not smoke and denies chemical dependency history.      Over past 24 hours Yogesh received:    5(0.2mg) IV, 1(0.5mg) and 1(0.4mg) IV hydromorphone, 1(10mg) oxycodone as well as 2(4mg) and 4(6mg) oral hydromorphone for MME of around 182 mg.      From review of  it doesn't appear patient has been on long acting or daily short acting opioids.  Patient reports taking oxycodone 10 mg dose about 4 times/day after knee surgery and had tapered himself off over past 4-5 weeks.         PLAN:   1) Pain is consistent with post op pain.  2)Multimodal Medication Therapy  Topical: None  NSAID'S: None post fusion, he does take Melxociam at home- defer to surgery team when NSAIDs can be resumed  Muscle Relaxants: Robaxin-1000 mg every 6 hours   Adjuvants: Hydroxyzine 25 mg every 6 hours as needed, Tylenol 975 mg every 8 hours   Opioids: per Surgery:  Oxycodone 10 to 15 mg every 4 hours as needed (states not noticing much relief from the hydromorphone)  IV Pain medication: IV hydromorphone 1 mg now and then 0.5 mg every 6 hours as needed  3)Non-medication interventions:  "Ice, rest, PT, OT  4)Constipation Prophylaxis: Scheduled and prn     -MN  pulled from system on 23. This indicates 11 prescriptions for oxycodone since 2023.  1 prescription for tramadol in 2023.  23 testosterone gel pump  23 oxycodone 5 mg #30 for 4 days by Keaton Stewart-same on 11/3/2023  10/27/2023 oxycodone 5 mg #40 for 10 days filled by Sarah Solares MD   10/16/2023 oxycodone 5 mg #40 filled by Todd Holter  Discharge Recommendations - We recommend prescribing the following at the time of discharge: per surgery      Subjective:  \"Severe\" the worst he has experienced.  Not getting much relief from any of the medications he has received so far (IV hydromorphone, oral hydromorphone)  He does get itchy from the oxycodone but can manage if taken with vistaril.    Pain is across lower back constant like a bar going across, with sharp quality around incision.  Shooting pain with movement.    Reviewed allergy to gabapentin and lyrica with patient and difficulty with loose stools associated with antibiotic therapy he is on.           H/O Spinal surgery   Patient Active Problem List   Diagnosis    H/O Spinal surgery        History   Drug Use Unknown         Tobacco Use      Smoking status: Former        Types: Cigars        Quit date:         Years since quittin.9      Smokeless tobacco: Never         acetaminophen  975 mg Oral Q8H    atorvastatin  10 mg Oral Daily    doxycycline hyclate  100 mg Oral BID    insulin aspart  1-7 Units Subcutaneous TID AC    insulin aspart  1-5 Units Subcutaneous At Bedtime    methocarbamol  750 mg Oral Q6H    polyethylene glycol  17 g Oral Daily    rifampin  300 mg Oral BID    senna-docusate  1 tablet Oral BID    sodium chloride (PF)  3 mL Intracatheter Q8H       Objective:  Vital signs in last 24 hours:  B/P: 134/62, T: 99, P: 97, R: 21   Blood pressure 134/62, pulse 97, temperature 99  F (37.2  C), temperature source Temporal, resp. rate 21, " "height 1.93 m (6' 4\"), weight 144.9 kg (319 lb 6.4 oz), SpO2 94%.      Weight:   Wt Readings from Last 2 Encounters:   12/18/23 144.9 kg (319 lb 6.4 oz)         Intake/Output:    Intake/Output Summary (Last 24 hours) at 12/19/2023 0959  Last data filed at 12/19/2023 0913  Gross per 24 hour   Intake 940 ml   Output 2830 ml   Net -1890 ml        Review of Systems:   As per subjective, all others negative.    Physical Exam:     General Appearance:  Alert, rests flat in bed, moderate distress, grimacing.    Head:  Normocephalic, without obvious abnormality, atraumatic   Eyes:  PERRL, conjunctiva/corneas clear, EOM's intact   ENT/Throat: Lips moist, pink    Lymph/Neck: Supple, symmetrical, trachea midline   Lungs:    On room air, respirations unlabored   Abdomen:   Soft, non-tender, non distended   Musculoskeletal: Extremities normal, atraumatic     Skin: Skin is warm, intact    Neurologic: Alert and oriented X 3, difficulty moving due to pain         Imaging:  Personally Reviewed.    No results found for this visit on 12/18/23.     Lab Results:  Personally Reviewed.   Last Comprehensive Metabolic Panel:  Sodium   Date Value Ref Range Status   12/19/2023 138 135 - 145 mmol/L Final     Comment:     Reference intervals for this test were updated on 09/26/2023 to more accurately reflect our healthy population. There may be differences in the flagging of prior results with similar values performed with this method. Interpretation of those prior results can be made in the context of the updated reference intervals.      Potassium   Date Value Ref Range Status   12/19/2023 4.3 3.4 - 5.3 mmol/L Final     Chloride   Date Value Ref Range Status   12/19/2023 103 98 - 107 mmol/L Final     Carbon Dioxide (CO2)   Date Value Ref Range Status   12/19/2023 24 22 - 29 mmol/L Final     Anion Gap   Date Value Ref Range Status   12/19/2023 11 7 - 15 mmol/L Final     Glucose   Date Value Ref Range Status   12/19/2023 177 (H) 70 - 99 mg/dL " "Final   12/19/2023 177 (H) 70 - 99 mg/dL Final     GLUCOSE BY METER POCT   Date Value Ref Range Status   12/19/2023 180 (H) 70 - 99 mg/dL Final     Urea Nitrogen   Date Value Ref Range Status   12/19/2023 9.9 6.0 - 20.0 mg/dL Final     Creatinine   Date Value Ref Range Status   12/19/2023 0.67 0.67 - 1.17 mg/dL Final     GFR Estimate   Date Value Ref Range Status   12/19/2023 >90 >60 mL/min/1.73m2 Final     Calcium   Date Value Ref Range Status   12/19/2023 8.6 8.6 - 10.0 mg/dL Final        UA: No results found for: \"UAMP\", \"UBARB\", \"BENZODIAZEUR\", \"UCANN\", \"UCOC\", \"OPIT\", \"UPCP\"           MANAGEMENT DISCUSSED with the following over the past 24 hours: RN   NOTE(S)/MEDICAL RECORDS REVIEWED over the past 24 hours: Hospital Medicine, Pain Consult, Spine Surgery  Medical complexity over the past 24 hours:  - Parenteral (IV) CONTROLLED SUBSTANCES ordered  - Prescription DRUG MANAGEMENT performed      ADA Crawford, DNP CNS  Acute Inpatient Pain Team  M-F   Paging via [employer] or [employer]         "

## 2023-12-20 ENCOUNTER — APPOINTMENT (OUTPATIENT)
Dept: PHYSICAL THERAPY | Facility: CLINIC | Age: 53
DRG: 455 | End: 2023-12-20
Attending: ORTHOPAEDIC SURGERY
Payer: COMMERCIAL

## 2023-12-20 ENCOUNTER — APPOINTMENT (OUTPATIENT)
Dept: OCCUPATIONAL THERAPY | Facility: CLINIC | Age: 53
DRG: 455 | End: 2023-12-20
Attending: ORTHOPAEDIC SURGERY
Payer: COMMERCIAL

## 2023-12-20 LAB
ANION GAP SERPL CALCULATED.3IONS-SCNC: 10 MMOL/L (ref 7–15)
BASOPHILS # BLD AUTO: 0.1 10E3/UL (ref 0–0.2)
BASOPHILS NFR BLD AUTO: 1 %
BUN SERPL-MCNC: 13 MG/DL (ref 6–20)
CALCIUM SERPL-MCNC: 8.8 MG/DL (ref 8.6–10)
CHLORIDE SERPL-SCNC: 103 MMOL/L (ref 98–107)
CREAT SERPL-MCNC: 0.67 MG/DL (ref 0.67–1.17)
DEPRECATED HCO3 PLAS-SCNC: 23 MMOL/L (ref 22–29)
EGFRCR SERPLBLD CKD-EPI 2021: >90 ML/MIN/1.73M2
EOSINOPHIL # BLD AUTO: 0.1 10E3/UL (ref 0–0.7)
EOSINOPHIL NFR BLD AUTO: 1 %
ERYTHROCYTE [DISTWIDTH] IN BLOOD BY AUTOMATED COUNT: 15 % (ref 10–15)
GLUCOSE BLDC GLUCOMTR-MCNC: 164 MG/DL (ref 70–99)
GLUCOSE BLDC GLUCOMTR-MCNC: 175 MG/DL (ref 70–99)
GLUCOSE BLDC GLUCOMTR-MCNC: 192 MG/DL (ref 70–99)
GLUCOSE BLDC GLUCOMTR-MCNC: 206 MG/DL (ref 70–99)
GLUCOSE SERPL-MCNC: 166 MG/DL (ref 70–99)
HCT VFR BLD AUTO: 46.7 % (ref 40–53)
HGB BLD-MCNC: 15.3 G/DL (ref 13.3–17.7)
IMM GRANULOCYTES # BLD: 0.1 10E3/UL
IMM GRANULOCYTES NFR BLD: 1 %
LYMPHOCYTES # BLD AUTO: 1 10E3/UL (ref 0.8–5.3)
LYMPHOCYTES NFR BLD AUTO: 10 %
MCH RBC QN AUTO: 27.8 PG (ref 26.5–33)
MCHC RBC AUTO-ENTMCNC: 32.8 G/DL (ref 31.5–36.5)
MCV RBC AUTO: 85 FL (ref 78–100)
MONOCYTES # BLD AUTO: 1.1 10E3/UL (ref 0–1.3)
MONOCYTES NFR BLD AUTO: 12 %
NEUTROPHILS # BLD AUTO: 7.1 10E3/UL (ref 1.6–8.3)
NEUTROPHILS NFR BLD AUTO: 75 %
NRBC # BLD AUTO: 0 10E3/UL
NRBC BLD AUTO-RTO: 0 /100
PLATELET # BLD AUTO: 145 10E3/UL (ref 150–450)
POTASSIUM SERPL-SCNC: 4 MMOL/L (ref 3.4–5.3)
RBC # BLD AUTO: 5.5 10E6/UL (ref 4.4–5.9)
SODIUM SERPL-SCNC: 136 MMOL/L (ref 135–145)
WBC # BLD AUTO: 9.4 10E3/UL (ref 4–11)

## 2023-12-20 PROCEDURE — 250N000013 HC RX MED GY IP 250 OP 250 PS 637: Performed by: STUDENT IN AN ORGANIZED HEALTH CARE EDUCATION/TRAINING PROGRAM

## 2023-12-20 PROCEDURE — 97116 GAIT TRAINING THERAPY: CPT | Mod: GP

## 2023-12-20 PROCEDURE — 97166 OT EVAL MOD COMPLEX 45 MIN: CPT | Mod: GO | Performed by: OCCUPATIONAL THERAPIST

## 2023-12-20 PROCEDURE — 80048 BASIC METABOLIC PNL TOTAL CA: CPT

## 2023-12-20 PROCEDURE — 120N000001 HC R&B MED SURG/OB

## 2023-12-20 PROCEDURE — 250N000013 HC RX MED GY IP 250 OP 250 PS 637: Performed by: INTERNAL MEDICINE

## 2023-12-20 PROCEDURE — 99232 SBSQ HOSP IP/OBS MODERATE 35: CPT | Performed by: CLINICAL NURSE SPECIALIST

## 2023-12-20 PROCEDURE — 85025 COMPLETE CBC W/AUTO DIFF WBC: CPT

## 2023-12-20 PROCEDURE — 250N000013 HC RX MED GY IP 250 OP 250 PS 637

## 2023-12-20 PROCEDURE — 250N000013 HC RX MED GY IP 250 OP 250 PS 637: Performed by: CLINICAL NURSE SPECIALIST

## 2023-12-20 PROCEDURE — 250N000011 HC RX IP 250 OP 636: Performed by: CLINICAL NURSE SPECIALIST

## 2023-12-20 PROCEDURE — 99232 SBSQ HOSP IP/OBS MODERATE 35: CPT | Performed by: INTERNAL MEDICINE

## 2023-12-20 PROCEDURE — 36415 COLL VENOUS BLD VENIPUNCTURE: CPT

## 2023-12-20 PROCEDURE — 250N000011 HC RX IP 250 OP 636: Mod: JZ | Performed by: NURSE PRACTITIONER

## 2023-12-20 PROCEDURE — 97535 SELF CARE MNGMENT TRAINING: CPT | Mod: GO | Performed by: OCCUPATIONAL THERAPIST

## 2023-12-20 PROCEDURE — 97530 THERAPEUTIC ACTIVITIES: CPT | Mod: GP

## 2023-12-20 PROCEDURE — 97530 THERAPEUTIC ACTIVITIES: CPT | Mod: GO | Performed by: OCCUPATIONAL THERAPIST

## 2023-12-20 PROCEDURE — 250N000011 HC RX IP 250 OP 636: Performed by: ORTHOPAEDIC SURGERY

## 2023-12-20 RX ORDER — HYDROMORPHONE HYDROCHLORIDE 1 MG/ML
0.5 INJECTION, SOLUTION INTRAMUSCULAR; INTRAVENOUS; SUBCUTANEOUS
Status: DISCONTINUED | OUTPATIENT
Start: 2023-12-20 | End: 2023-12-21 | Stop reason: HOSPADM

## 2023-12-20 RX ORDER — NAPROXEN 500 MG/1
500 TABLET ORAL 2 TIMES DAILY WITH MEALS
Qty: 28 TABLET | Refills: 0 | Status: SHIPPED | OUTPATIENT
Start: 2023-12-20 | End: 2024-01-03

## 2023-12-20 RX ORDER — KETOROLAC TROMETHAMINE 15 MG/ML
15 INJECTION, SOLUTION INTRAMUSCULAR; INTRAVENOUS EVERY 6 HOURS
Status: COMPLETED | OUTPATIENT
Start: 2023-12-20 | End: 2023-12-20

## 2023-12-20 RX ORDER — KETOROLAC TROMETHAMINE 15 MG/ML
15 INJECTION, SOLUTION INTRAMUSCULAR; INTRAVENOUS EVERY 6 HOURS
Status: DISCONTINUED | OUTPATIENT
Start: 2023-12-20 | End: 2023-12-20

## 2023-12-20 RX ORDER — OXYCODONE HYDROCHLORIDE 10 MG/1
10 TABLET ORAL EVERY 4 HOURS
Status: DISCONTINUED | OUTPATIENT
Start: 2023-12-20 | End: 2023-12-21 | Stop reason: HOSPADM

## 2023-12-20 RX ORDER — OXYCODONE HYDROCHLORIDE 10 MG/1
10 TABLET ORAL EVERY 4 HOURS
Status: DISCONTINUED | OUTPATIENT
Start: 2023-12-20 | End: 2023-12-20 | Stop reason: ALTCHOICE

## 2023-12-20 RX ORDER — OXYCODONE HCL 20 MG/1
20 TABLET, FILM COATED, EXTENDED RELEASE ORAL EVERY 12 HOURS
Qty: 14 TABLET | Refills: 0 | Status: SHIPPED | OUTPATIENT
Start: 2023-12-20 | End: 2023-12-21

## 2023-12-20 RX ORDER — OXYCODONE HYDROCHLORIDE 5 MG/1
5-10 TABLET ORAL
Status: DISCONTINUED | OUTPATIENT
Start: 2023-12-20 | End: 2023-12-21 | Stop reason: HOSPADM

## 2023-12-20 RX ADMIN — INSULIN ASPART 1 UNITS: 100 INJECTION, SOLUTION INTRAVENOUS; SUBCUTANEOUS at 11:53

## 2023-12-20 RX ADMIN — RIFAMPIN 300 MG: 300 CAPSULE ORAL at 19:25

## 2023-12-20 RX ADMIN — ACETAMINOPHEN 975 MG: 325 TABLET, FILM COATED ORAL at 11:10

## 2023-12-20 RX ADMIN — OXYCODONE HYDROCHLORIDE 15 MG: 10 TABLET ORAL at 11:10

## 2023-12-20 RX ADMIN — METHOCARBAMOL 1000 MG: 500 TABLET ORAL at 06:17

## 2023-12-20 RX ADMIN — HYDROXYZINE HYDROCHLORIDE 25 MG: 25 TABLET, FILM COATED ORAL at 17:27

## 2023-12-20 RX ADMIN — METHOCARBAMOL 1000 MG: 500 TABLET ORAL at 11:10

## 2023-12-20 RX ADMIN — HYDROXYZINE HYDROCHLORIDE 25 MG: 25 TABLET, FILM COATED ORAL at 12:11

## 2023-12-20 RX ADMIN — METHOCARBAMOL 1000 MG: 500 TABLET ORAL at 17:27

## 2023-12-20 RX ADMIN — INSULIN ASPART 1 UNITS: 100 INJECTION, SOLUTION INTRAVENOUS; SUBCUTANEOUS at 17:30

## 2023-12-20 RX ADMIN — ACETAMINOPHEN 975 MG: 325 TABLET, FILM COATED ORAL at 03:47

## 2023-12-20 RX ADMIN — METHOCARBAMOL 1000 MG: 500 TABLET ORAL at 23:52

## 2023-12-20 RX ADMIN — HYDROXYZINE HYDROCHLORIDE 25 MG: 25 TABLET, FILM COATED ORAL at 06:17

## 2023-12-20 RX ADMIN — KETOROLAC TROMETHAMINE 15 MG: 15 INJECTION, SOLUTION INTRAMUSCULAR; INTRAVENOUS at 01:06

## 2023-12-20 RX ADMIN — DOXYCYCLINE HYCLATE 100 MG: 100 CAPSULE ORAL at 19:25

## 2023-12-20 RX ADMIN — ATORVASTATIN CALCIUM 10 MG: 10 TABLET, FILM COATED ORAL at 08:17

## 2023-12-20 RX ADMIN — HYDROMORPHONE HYDROCHLORIDE 0.2 MG: 0.2 INJECTION, SOLUTION INTRAMUSCULAR; INTRAVENOUS; SUBCUTANEOUS at 03:48

## 2023-12-20 RX ADMIN — LISINOPRIL 40 MG: 40 TABLET ORAL at 08:17

## 2023-12-20 RX ADMIN — OXYCODONE HYDROCHLORIDE 15 MG: 10 TABLET ORAL at 06:16

## 2023-12-20 RX ADMIN — SENNOSIDES AND DOCUSATE SODIUM 1 TABLET: 8.6; 5 TABLET ORAL at 19:24

## 2023-12-20 RX ADMIN — OXYCODONE HYDROCHLORIDE 15 MG: 10 TABLET ORAL at 01:05

## 2023-12-20 RX ADMIN — KETOROLAC TROMETHAMINE 15 MG: 15 INJECTION, SOLUTION INTRAMUSCULAR; INTRAVENOUS at 07:00

## 2023-12-20 RX ADMIN — OXYCODONE HYDROCHLORIDE 15 MG: 10 TABLET ORAL at 15:05

## 2023-12-20 RX ADMIN — DOXYCYCLINE HYCLATE 100 MG: 100 CAPSULE ORAL at 08:16

## 2023-12-20 RX ADMIN — HYDROMORPHONE HYDROCHLORIDE 1 MG: 1 INJECTION, SOLUTION INTRAMUSCULAR; INTRAVENOUS; SUBCUTANEOUS at 10:01

## 2023-12-20 RX ADMIN — HYDROXYZINE HYDROCHLORIDE 25 MG: 25 TABLET, FILM COATED ORAL at 22:50

## 2023-12-20 RX ADMIN — RIFAMPIN 300 MG: 300 CAPSULE ORAL at 08:16

## 2023-12-20 RX ADMIN — ACETAMINOPHEN 975 MG: 325 TABLET, FILM COATED ORAL at 19:24

## 2023-12-20 RX ADMIN — OXYCODONE HYDROCHLORIDE 15 MG: 10 TABLET ORAL at 19:24

## 2023-12-20 RX ADMIN — OXYCODONE HYDROCHLORIDE 15 MG: 10 TABLET ORAL at 22:50

## 2023-12-20 RX ADMIN — INSULIN ASPART 1 UNITS: 100 INJECTION, SOLUTION INTRAVENOUS; SUBCUTANEOUS at 08:16

## 2023-12-20 ASSESSMENT — ACTIVITIES OF DAILY LIVING (ADL)
ADLS_ACUITY_SCORE: 27
PREVIOUS_RESPONSIBILITIES: LAUNDRY
ADLS_ACUITY_SCORE: 27

## 2023-12-20 NOTE — PLAN OF CARE
Goal Outcome Evaluation:      Plan of Care Reviewed With: patient    Overall Patient Progress: no changeOverall Patient Progress: no change     Afeb, vss, numbness to both feet and legs to the knee but good strength. Dressing removed per neurosurgery PA and incisions look good, some bruising but no drainage so left open to air. Continues to c/o pain all day, a 7 inspite of 1mg IV dilaudid. Pain nurse did go ahead and schedule the oxycodone every 4 hours to start after the IV dose. Pain still a 7. PT felt he was worse than yesterday needing help to get in and out of bed which he could do on his own yesterday. Up with OT in the room and in to the bathroom but has not been able to walk out of the room yet. Biggest c/o is his left groin area. The plan is to go home with family once he can control the pain w/o IV narcotics.

## 2023-12-20 NOTE — PLAN OF CARE
Goal Outcome Evaluation:   Vital signs stable.  CMS, baseline neuropathy in feet.  Dressing intact.  Ambulated to bathroom with walker, gait belt and 1 assist. Voiding. Refused to ambulate in hallway or sit up in chair for supper.  Log rolled in bed to reposition.  Ice pack applied to lumbar spine.  Pain controlled with tylenol, oxycodone, vistaril, robaxin and iv dilaudid for breakthrough pain.    Plan of Care Reviewed With: patient

## 2023-12-20 NOTE — PLAN OF CARE
/60  Pulse 94   Temp 99.1  F  Resp 20  SpO2 95%      Patient is alert and oriented x4, lot of pain and oxycodone, Toradol, atarax, tylenol, and dilaudid was administered through out the night. NS at 100 mL/hr. Did not get up during the night but was log rolling in bed and repositioning himself. States he did not sleep. Incision is clean dry and intact. Ice was applied as well.

## 2023-12-20 NOTE — PROGRESS NOTES
[facility]    ([facility], [facility], [facility], [facility])  Pain Progress Note    Assessment/Plan:  Francisco Cortés is a 53 year old male who was admitted on 12/18/2023.  Pain Service is asked to see the patient for  acute post op pain.  Admitted for planned procedure. Post Operative Day #2  L4-L5 interbody and posterior lateral fusion.   History of septic right knee joint, status post I&D,  total knee replacement 10/4, cellulitis of left foot, chronic low back pain, chronic osteomyelitis, chronic pain of both lower extremities, degenerative disc disease, diabetes mellitus, diabetic peripheral neuropathy, diabetic ulcer of the left foot, hypertension, obesity, lumbar radiculopathy, polyneuropathy, obesity.  On longterm antibiotics.The patient reports loose stools with antibiotics. He reports Oxycodone not effective for pain. The patient does not smoke and denies chemical dependency history.      Over past 24 hours Yogesh received:  3(0.2mg) IV hydromorphone 1(1mg) IV hydromorphone and 5(15mg) oxycodone, 144 MME.    vistaril 25 mg times three,  and 4 scheduled 1000 mg Robaxin         From review of  it doesn't appear patient has been on long acting or daily short acting opioids.  Patient reports taking oxycodone 10 mg dose about 4 times/day after knee surgery and had tapered himself off over past 4-5 weeks.         PLAN:   1) Pain is consistent with post op pain.  2)Multimodal Medication Therapy  Topical: None  NSAID'S: None post fusion, he does take Melxociam at home- defer to surgery team when NSAIDs can be resumed  Muscle Relaxants: Robaxin-1000 mg every 6 hours   Adjuvants: Hydroxyzine 25 mg every 6 hours as needed, Tylenol 975 mg every 8 hours   Opioids: per Surgery:  Oxycodone 10 to 15 mg every 4 hours with additional 5 mg every three hours prn   IV Pain medication: IV hydromorphone 1 mg now and then 0.5 mg every 6 hours as needed  3)Non-medication  "interventions: Ice, rest, PT, OT  4)Constipation Prophylaxis: Scheduled and prn     -MN  pulled from system on 23. This indicates 11 prescriptions for oxycodone since 2023.  1 prescription for tramadol in 2023.  23 testosterone gel pump  23 oxycodone 5 mg #30 for 4 days by Keaton Stewart-same on 11/3/2023  10/27/2023 oxycodone 5 mg #40 for 10 days filled by Sarah Solares MD   10/16/2023 oxycodone 5 mg #40 filled by Todd Holter  Discharge Recommendations - We recommend prescribing the following at the time of discharge: per surgery  May require script for oxycodone 10-15 mg every four hours prn, continue with Robaxin 1000 mg every 6 hours, vistaril prn pain, itching, tylenol 1000 mg tid          Subjective:  Pain \"about the same\" reports now at around \"6-7\"     Pain did go down considerably after receiving the IV hydromorphone and lasted  about 3 hours.  Didn't notice much relief from the oxycodone although reports overall pain scores down since yesterday.    Has been able to get out of bed but continues pretty limited with mobility/working with PT           H/O Spinal surgery   Patient Active Problem List   Diagnosis    H/O Spinal surgery        History   Drug Use Unknown         Tobacco Use      Smoking status: Former        Types: Cigars        Quit date:         Years since quittin.9      Smokeless tobacco: Never         acetaminophen  975 mg Oral Q8H    atorvastatin  10 mg Oral Daily    doxycycline hyclate  100 mg Oral BID    insulin aspart  1-7 Units Subcutaneous TID AC    insulin aspart  1-5 Units Subcutaneous At Bedtime    lisinopril  40 mg Oral Daily    methocarbamol  1,000 mg Oral Q6H    polyethylene glycol  17 g Oral Daily    rifampin  300 mg Oral BID    senna-docusate  1 tablet Oral BID    sodium chloride (PF)  3 mL Intracatheter Q8H       Objective:  Vital signs in last 24 hours:  B/P: 139/83, T: 98.8, P: 84, R: 20   Blood pressure 139/83, pulse 84, temperature " "98.8  F (37.1  C), temperature source Temporal, resp. rate 20, height 1.93 m (6' 4\"), weight 144.9 kg (319 lb 6.4 oz), SpO2 93%.      Weight:   Wt Readings from Last 2 Encounters:   12/18/23 144.9 kg (319 lb 6.4 oz)         Intake/Output:    Intake/Output Summary (Last 24 hours) at 12/20/2023 0901  Last data filed at 12/20/2023 0748  Gross per 24 hour   Intake 1935 ml   Output 2655 ml   Net -720 ml        Review of Systems:   As per subjective, all others negative.    Physical Exam:     General Appearance:  Alert, cooperative, flat affect  Patient is resting on side of bed   Head:  Normocephalic, without obvious abnormality, atraumatic   Eyes:  PERRL, conjunctiva/corneas clear, EOM's intact   ENT/Throat: Lips moist    Lymph/Neck: Supple, symmetrical, trachea midline   Lungs:    respirations unlabored   Abdomen:   Soft, non-tender, non distended, passing gas, no BM yet   Musculoskeletal: Extremities normal, atraumatic     Skin: Skin is warm, dry    Neurologic: Alert and oriented X 3, Moves all 4 extremities         Imaging:  Personally Reviewed.    No results found for this visit on 12/18/23.     Lab Results:  Personally Reviewed.   Last Comprehensive Metabolic Panel:  Sodium   Date Value Ref Range Status   12/20/2023 136 135 - 145 mmol/L Final     Comment:     Reference intervals for this test were updated on 09/26/2023 to more accurately reflect our healthy population. There may be differences in the flagging of prior results with similar values performed with this method. Interpretation of those prior results can be made in the context of the updated reference intervals.      Potassium   Date Value Ref Range Status   12/20/2023 4.0 3.4 - 5.3 mmol/L Final     Chloride   Date Value Ref Range Status   12/20/2023 103 98 - 107 mmol/L Final     Carbon Dioxide (CO2)   Date Value Ref Range Status   12/20/2023 23 22 - 29 mmol/L Final     Anion Gap   Date Value Ref Range Status   12/20/2023 10 7 - 15 mmol/L Final " "    Glucose   Date Value Ref Range Status   12/20/2023 166 (H) 70 - 99 mg/dL Final     GLUCOSE BY METER POCT   Date Value Ref Range Status   12/20/2023 206 (H) 70 - 99 mg/dL Final     Urea Nitrogen   Date Value Ref Range Status   12/20/2023 13.0 6.0 - 20.0 mg/dL Final     Creatinine   Date Value Ref Range Status   12/20/2023 0.67 0.67 - 1.17 mg/dL Final     GFR Estimate   Date Value Ref Range Status   12/20/2023 >90 >60 mL/min/1.73m2 Final     Calcium   Date Value Ref Range Status   12/20/2023 8.8 8.6 - 10.0 mg/dL Final        UA: No results found for: \"UAMP\", \"UBARB\", \"BENZODIAZEUR\", \"UCANN\", \"UCOC\", \"OPIT\", \"UPCP\"           MANAGEMENT DISCUSSED with the following over the past 24 hours: RN   NOTE(S)/MEDICAL RECORDS REVIEWED over the past 24 hours: Spine Surgery, Nursing, PT  Medical complexity over the past 24 hours:  - Parenteral (IV) CONTROLLED SUBSTANCES ordered  - Prescription DRUG MANAGEMENT performed      ADA Crawford, DNP CNS  Acute Inpatient Pain Team  M-F   Paging via [employer] or [employer]         "

## 2023-12-20 NOTE — PROGRESS NOTES
"   12/20/23 1106   Appointment Info   Signing Clinician's Name / Credentials (OT) Christen Ynacy OTR/L   Rehab Comments (OT) spine prec   Living Environment   People in Home child(fred), adult;spouse   Current Living Arrangements house   Home Accessibility stairs to enter home;stairs within home   Number of Stairs, Main Entrance 2   Stair Railings, Main Entrance none   Number of Stairs, Within Home, Primary four;five   Stair Railings, Within Home, Primary railings safe and in good condition   Transportation Anticipated car, drives self;family or friend will provide   Living Environment Comments Pt alone during the day, family at work/school   Self-Care   Usual Activity Tolerance moderate   Current Activity Tolerance fair   Equipment Currently Used at Home cane, straight;dressing device;grab bar, tub/shower;raised toilet seat;walker, rolling;walker, standard   Fall history within last six months no   Activity/Exercise/Self-Care Comment Pt reports indep with ADLs prior to surgery, but has had assist with dressing following previous surgeries this year. Has not worked since Feb ().   Instrumental Activities of Daily Living (IADL)   Previous Responsibilities laundry   IADL Comments pt reports family can assist \"when they are home\"   General Information   Onset of Illness/Injury or Date of Surgery 12/18/23   Referring Physician Valeria Alicia PA-C   Patient/Family Therapy Goal Statement (OT) home, but does not feel ready today   Additional Occupational Profile Info/Pertinent History of Current Problem per chart: 53 year old male with a past medical history significant for obesity, type II DM, hypertension, septic right knee joint status post I&D total knee replacement 10/4, chronic lower back pain and bilateral sciatica  who was admitted to the hospital for an elective L4-L5 interbody and posterior lateral fusion.   Existing Precautions/Restrictions fall;spinal   Left Upper Extremity (Weight-bearing Status) " partial weight-bearing (PWB)  (#10)   Right Upper Extremity (Weight-bearing Status) partial weight-bearing (PWB)  (#10)   Left Lower Extremity (Weight-bearing Status) weight-bearing as tolerated (WBAT)   Right Lower Extremity (Weight-bearing Status) weight-bearing as tolerated (WBAT)   General Observations and Info Activity order: ambulate. No excessive activities   No Bending, Twisting, climbing, Crawling,   No lifting more than 8 lb for 2 weeks, or 15 lb for 2 months or 25 lb for 4 months or 35 lb for 6 months   Brace for riding cars for 4-6 months   Cognitive Status Examination   Affect/Mental Status (Cognitive) WNL   Follows Commands follows one-step commands;over 90% accuracy   Cognitive Status Comments no cognitive concerns   Visual Perception   Visual Impairment/Limitations corrective lenses full-time   Pain Assessment   Patient Currently in Pain Yes, see Vital Sign flowsheet   Posture   Posture forward head position;protracted shoulders   Range of Motion Comprehensive   Comment, General Range of Motion BUE WFL   Strength Comprehensive (MMT)   Comment, General Manual Muscle Testing (MMT) Assessment not MMT due to spinal prec, BUE appear WFL but limited by pain   Coordination   Upper Extremity Coordination No deficits were identified   Bed Mobility   Bed Mobility supine-sit;sit-supine   Supine-Sit [place] (Bed Mobility) contact guard;verbal cues   Sit-Supine [place] (Bed Mobility) contact guard;verbal cues   Assistive Device (Bed Mobility) bed rails   Comment (Bed Mobility) HOB 30*   Transfers   Transfers sit-stand transfer;toilet transfer;shower transfer   Sit-Stand Transfer   Sit-Stand [place] (Transfers) contact guard;verbal cues   Assistive Device (Sit-Stand Transfers) walker, front-wheeled   Sit/Stand Transfer Comments bed heightened per pt request; pt has vaulted bed at home   Shower Transfer   [place] Level (Shower Transfer) minimum assist (75% patient effort)   Shower Transfer  Comments per clinical judgement; walk-in shower w/grab bars   Toilet Transfer   Type (Toilet Transfer) sit-stand;stand-sit   [place] Level (Toilet Transfer) minimum assist (75% patient effort);verbal cues   Assistive Device (Toilet Transfer) commode chair   Balance   Balance Comments good seated; good ambulating in room with 2WW   Activities of Daily Living   BADL Assessment/Intervention lower body dressing;grooming;toileting   Lower Body Dressing Assessment/Training   Comment, (Lower Body Dressing) pt has AE from knee surgeries, reports family can assist   [place] Level (Lower Body Dressing) maximum assist (25% patient effort)   Grooming Assessment/Training   [place] Level (Grooming) moderate assist (50% patient effort)   Toileting   [place] Level (Toileting) maximum assist (25% patient effort)   Clinical Impression   Criteria for Skilled Therapeutic Interventions Met (OT) Yes, treatment indicated   OT Diagnosis impaired ADLs   OT Problem List-Impairments impacting ADL problems related to;activity tolerance impaired;range of motion (ROM);strength;pain;post-surgical precautions   Assessment of Occupational Performance 3-5 Performance Deficits   Identified Performance Deficits dressing, toilet transfer, toileting, shower transfer, home chores   Planned Therapy Interventions (OT) ADL retraining;IADL retraining;transfer training;home program guidelines   Clinical Decision Making Complexity (OT) detailed assessment/moderate complexity   Risk & Benefits of therapy have been explained evaluation/treatment results reviewed;patient   OT Total Evaluation Time   OT Eval, Moderate Complexity Minutes (26748) 6   OT Goals   Therapy Frequency (OT) Daily   OT Predicted Duration/Target Date for Goal Attainment 12/27/23   OT Goals Hygiene/Grooming;Lower Body Dressing;Lower Body Bathing;Toilet Transfer/Toileting;OT Goal 1   OT: Hygiene/Grooming supervision/stand-by assist;within precautions;while standing   OT:  "Lower Body Dressing Minimal assist;using adaptive equipment;within precautions   OT: Lower Body Bathing Modified independent   OT: Toilet Transfer/Toileting Modified independent;toilet transfer;cleaning and garment management;using adaptive equipment;within precautions   OT: Goal 1 SBA shower transfer using DME prn   Interventions   Interventions Quick Adds Self-Care/Home Management;Therapeutic Activity   Self-Care/Home Management   Self-Care/Home Mgmt/ADL, Compensatory, Meal Prep Minutes (18369) 24   Symptoms Noted During/After Treatment (Meal Preparation/Planning Training) fatigue;increased pain   Treatment Detail/Skilled Intervention Pt able to recall 2/3 spinal prec, reviewed 3/3 and ed pt in acronym \"no BLTs\" to assist with recall.  Seated EOB, reviewed LE dressing using AE.  Pt declines to practice, stating he used AE following previous surgeries, and family has assisted following recent surgeries.  Ed pt in maintaining spinal prec with dressing, pt verbalizing understanding.  Ed pt in using figure-4, pt unable to achieve due to R knee multiple surgeries this year.  Ed pt in community sources for AE, pt reports losing his sock aide.  Ed pt in toilet transfer maintaining spinal prec, in backing up fully to toilet without twisting to look for it, in hand placement, and pt completed SBA following.  Ed pt in toilet tongs v bidet v toilet akash for maintaining spinal prec for jeronimo-cares, pt verbalized understanding.  Rec having wipes in BR, pt endorsing.  Ed pt in oral cares/hygienes with adherence to spinal prec, pt verbalized understanding. Ed pt in home chores to avoid, and others to modify to adhere to prec, pt verbalized understanding.  AE/DME handout issued.   Therapeutic Activities   Therapeutic Activity Minutes (51529) 10   Symptoms noted during/after treatment fatigue;increased pain   Treatment Detail/Skilled Intervention Pt greeted sidelying, agreeable to activity.  Pt needs increased time for all " movement, pausing frequently for pain management.  Pt cued for supine > sit using logroll tech. Pt completed with cues for progressing LE, cues for hand placement, pt declining to use rail and ModA holding TH hand, pt declining to attempt flat bed and raised HOB to 30*. CGA STS from heightened bed, pt declining to attempt chair-height, reports his bed at home is vaulted. CGA ambulation in room using 2WW, slow steps. On return to bed, CGA using bedrail.  Pillows for comfort and positioning. Call light in reach, BA engaged.   OT Discharge Planning   OT Plan LE dressing w/AE if pt wants review, g/h standing at sink, shower transfer, toileting   OT Discharge Recommendation (DC Rec) home with assist   OT Rationale for DC Rec Pt below baseline, limited by pain, weakness, surgical precautions.  Anticipate pt will progress to home with Assist for I/ADLs prn, once medically stable.   OT Brief overview of current status SBA toilet transfer to commode overlay   Total Session Time   Timed Code Treatment Minutes 34   Total Session Time (sum of timed and untimed services) 40

## 2023-12-20 NOTE — PROGRESS NOTES
DAILY PROGRESS NOTE    Francisco Cortés is a 53 year old old male admitted on 12/18/2023  5:23 AM.    Subjective  Yogesh presents POD2 From a L4-L5 OLLIF on 10/18/23.   Today pt notes that he is having pain in his lower extremities and low back. He reports that he has not been able to sleep due to the pain, but was noted to be sleeping when I came into the room.He reports walking around with staff and is voiding. Staff notes that he has been ambulating with them in the room.   Pain is our main deciding factor at this point,       Objective    AAOx3, TIWARI ,Strength intact, sensation intact  Ambulating,     Hemoglobin   Date Value Ref Range Status   12/20/2023 15.3 13.3 - 17.7 g/dL Final   ]      Impression / Plan  Pain management  Patient is ready for discharge from a surgical standpoint    Plan for today:    Patient doing well  Ambulate with help  PT OT, possibly clearance   Full diet  Today  PO meds today   Pain Management with possible discharge    Valeria Haberer PAC

## 2023-12-20 NOTE — PROGRESS NOTES
[facility]  Hospitalist Progress Note  Mukund Flanagan M.D., M.B.A.   12/20/2023    Reason for Stay/active problem list    Chronic lower back pain and bilateral sciatica Status post L4-L5 interbody and posterior lateral fusion         Assessment and Plan:        Summary of Stay: Francisco Cortés is a 53 year old male with a past medical history significant for obesity, type II DM, hypertension, septic right knee joint status post I&D total knee replacement 10/4, chronic lower back pain and bilateral sciatica  who was admitted to the hospital for an elective L4-L5 interbody and posterior lateral fusion.  Hospital medicine was consulted for the medical management.   Problem List with Assessment and Plan:    Chronic lower back pain and bilateral sciatica  Status post L4-L5 interbody and posterior lateral fusion  -Patient has been complaining of severe pain.  Pain management team is consulted and managing patient's pain.  --Currently patient's pain control better.  Pain management team input is appreciated.  Continue pain control regimen per pain management team and discharge planning deferred to primary team     Recent right knee I&D and total knee replacement  -No acute symptoms, patient follows orthopedics and infectious disease outpatient.   -Patient was advised to continue anticoagulation for 6 weeks postsurgery she has completed.  -Further anticoagulation per spine.  -Prior to admission was on doxycycline and rifampin  -Resumed doxycycline and rifampin     Type II DM complicated by neuropathy  -Most recent A1c was 5.6 in 9/20/2023  -Prior to admission was on Trulicity and Jardiance  -Resumed Jardiance  -Started medium resistant sliding scale insulin    Hypertension  -Prior to admission was on lisinopril  -Resumed lisinopril           VTE Prophylaxis: Defer to primary service  Code Status: Full Code  Diet: Advance Diet as Tolerated: Regular Diet Adult  Diet    Figueroa Catheter: Not  "present         Disposition: Per primary team pending pain control      Interval History (Subjective):        Patient is seen and examined by me today and medical record reviewed.Overnight events noted and care discussed with nursing staff.  Patient is still having pain but his pain is much better with pain medications.  Denies any other symptoms.       Physical Exam:        Last Vital Signs:  /83 (BP Location: Left arm)   Pulse 84   Temp 98.8  F (37.1  C) (Temporal)   Resp 20   Ht 1.93 m (6' 4\")   Wt 144.9 kg (319 lb 6.4 oz)   SpO2 93%   BMI 38.88 kg/m      I/O last 3 completed shifts:  In: 1695 [P.O.:1695]  Out: 2855 [Urine:2855]    Wt Readings from Last 5 Encounters:   12/18/23 144.9 kg (319 lb 6.4 oz)        Constitutional: Awake, alert, cooperative, no apparent distress     Respiratory: Clear to auscultation bilaterally, no crackles or wheezing   Cardiovascular: Regular rate and rhythm, normal S1 and S2, and no murmur noted   Abdomen: Normal bowel sounds, soft, non-distended, non-tender   Skin: No new rashes, no cyanosis, dry to touch   Neuro: Alert with  no new focal weakness   Extremities: No edema   Other(s):        All other systems: Negative          Medications:        All current medications were reviewed with changes reflected in problem list.         Data:      All new lab and imaging data was reviewed.      Data reviewed today: I reviewed all new labs and imaging results over the last 24 hours. I personally reviewed       Recent Labs   Lab 12/20/23  0659 12/19/23  0641 12/18/23  0633   WBC 9.4 11.3*  --    HGB 15.3 14.9 15.7   HCT 46.7 45.0  --    MCV 85 84  --    * 159  --      No results for input(s): \"CULT\" in the last 168 hours.  Recent Labs   Lab 12/20/23  1148 12/20/23  0659 12/20/23  0209 12/19/23  0646 12/19/23  0641   NA  --  136  --   --  138   POTASSIUM  --  4.0  --   --  4.3   CHLORIDE  --  103  --   --  103   CO2  --  23  --   --  24   ANIONGAP  --  10  --   --  11 " "  * 166* 206*   < > 177*  177*   BUN  --  13.0  --   --  9.9   CR  --  0.67  --   --  0.67   GFRESTIMATED  --  >90  --   --  >90   RAFAELA  --  8.8  --   --  8.6    < > = values in this interval not displayed.       Recent Labs   Lab 12/20/23  1148 12/20/23  0659 12/20/23  0209 12/19/23  2112 12/19/23  1633   * 166* 206* 201* 190*       No results for input(s): \"INR\" in the last 168 hours.      No results for input(s): \"TROPONIN\", \"TROPI\", \"TROPR\" in the last 168 hours.    Invalid input(s): \"TROP\", \"TROPONINIES\"    Recent Results (from the past 48 hour(s))   XR Surgery JESSENIA L/T 5 Min Fluoro w Stills    Narrative    This exam was marked as non-reportable because it will not be read by a   radiologist or a [place] non-radiologist provider.             COVID Status:  COVID-19 PCR Results           No data to display              COVID-19 Antibody Results, Testing for Immunity           No data to display                 Disclaimer: This note consists of symbols derived from keyboarding, dictation and/or voice recognition software. As a result, there may be errors in the script that have gone undetected. Please consider this when interpreting information found in this chart.    "

## 2023-12-21 ENCOUNTER — APPOINTMENT (OUTPATIENT)
Dept: PHYSICAL THERAPY | Facility: CLINIC | Age: 53
DRG: 455 | End: 2023-12-21
Attending: ORTHOPAEDIC SURGERY
Payer: COMMERCIAL

## 2023-12-21 VITALS
SYSTOLIC BLOOD PRESSURE: 127 MMHG | HEART RATE: 81 BPM | BODY MASS INDEX: 38.36 KG/M2 | DIASTOLIC BLOOD PRESSURE: 77 MMHG | TEMPERATURE: 98.3 F | RESPIRATION RATE: 16 BRPM | OXYGEN SATURATION: 94 % | WEIGHT: 315 LBS | HEIGHT: 76 IN

## 2023-12-21 LAB
ANION GAP SERPL CALCULATED.3IONS-SCNC: 10 MMOL/L (ref 7–15)
BASOPHILS # BLD AUTO: 0.1 10E3/UL (ref 0–0.2)
BASOPHILS NFR BLD AUTO: 1 %
BUN SERPL-MCNC: 11.9 MG/DL (ref 6–20)
CALCIUM SERPL-MCNC: 9 MG/DL (ref 8.6–10)
CHLORIDE SERPL-SCNC: 102 MMOL/L (ref 98–107)
CREAT SERPL-MCNC: 0.64 MG/DL (ref 0.67–1.17)
DEPRECATED HCO3 PLAS-SCNC: 22 MMOL/L (ref 22–29)
EGFRCR SERPLBLD CKD-EPI 2021: >90 ML/MIN/1.73M2
EOSINOPHIL # BLD AUTO: 0.2 10E3/UL (ref 0–0.7)
EOSINOPHIL NFR BLD AUTO: 3 %
ERYTHROCYTE [DISTWIDTH] IN BLOOD BY AUTOMATED COUNT: 14.8 % (ref 10–15)
GLUCOSE BLDC GLUCOMTR-MCNC: 210 MG/DL (ref 70–99)
GLUCOSE BLDC GLUCOMTR-MCNC: 233 MG/DL (ref 70–99)
GLUCOSE SERPL-MCNC: 204 MG/DL (ref 70–99)
HCT VFR BLD AUTO: 45.8 % (ref 40–53)
HGB BLD-MCNC: 15.2 G/DL (ref 13.3–17.7)
IMM GRANULOCYTES # BLD: 0 10E3/UL
IMM GRANULOCYTES NFR BLD: 0 %
LYMPHOCYTES # BLD AUTO: 1 10E3/UL (ref 0.8–5.3)
LYMPHOCYTES NFR BLD AUTO: 11 %
MCH RBC QN AUTO: 27.4 PG (ref 26.5–33)
MCHC RBC AUTO-ENTMCNC: 33.2 G/DL (ref 31.5–36.5)
MCV RBC AUTO: 83 FL (ref 78–100)
MONOCYTES # BLD AUTO: 0.9 10E3/UL (ref 0–1.3)
MONOCYTES NFR BLD AUTO: 10 %
NEUTROPHILS # BLD AUTO: 7.5 10E3/UL (ref 1.6–8.3)
NEUTROPHILS NFR BLD AUTO: 75 %
NRBC # BLD AUTO: 0 10E3/UL
NRBC BLD AUTO-RTO: 0 /100
PLATELET # BLD AUTO: 179 10E3/UL (ref 150–450)
POTASSIUM SERPL-SCNC: 4.1 MMOL/L (ref 3.4–5.3)
RBC # BLD AUTO: 5.55 10E6/UL (ref 4.4–5.9)
SODIUM SERPL-SCNC: 134 MMOL/L (ref 135–145)
WBC # BLD AUTO: 9.8 10E3/UL (ref 4–11)

## 2023-12-21 PROCEDURE — 36415 COLL VENOUS BLD VENIPUNCTURE: CPT

## 2023-12-21 PROCEDURE — 250N000013 HC RX MED GY IP 250 OP 250 PS 637: Performed by: STUDENT IN AN ORGANIZED HEALTH CARE EDUCATION/TRAINING PROGRAM

## 2023-12-21 PROCEDURE — 97116 GAIT TRAINING THERAPY: CPT | Mod: GP

## 2023-12-21 PROCEDURE — 250N000013 HC RX MED GY IP 250 OP 250 PS 637: Performed by: CLINICAL NURSE SPECIALIST

## 2023-12-21 PROCEDURE — 250N000013 HC RX MED GY IP 250 OP 250 PS 637

## 2023-12-21 PROCEDURE — 85025 COMPLETE CBC W/AUTO DIFF WBC: CPT

## 2023-12-21 PROCEDURE — 250N000013 HC RX MED GY IP 250 OP 250 PS 637: Performed by: INTERNAL MEDICINE

## 2023-12-21 PROCEDURE — 97530 THERAPEUTIC ACTIVITIES: CPT | Mod: GP

## 2023-12-21 PROCEDURE — 80048 BASIC METABOLIC PNL TOTAL CA: CPT

## 2023-12-21 PROCEDURE — 99232 SBSQ HOSP IP/OBS MODERATE 35: CPT | Performed by: INTERNAL MEDICINE

## 2023-12-21 RX ORDER — OXYCODONE HYDROCHLORIDE 5 MG/1
15 TABLET ORAL EVERY 4 HOURS PRN
Qty: 42 TABLET | Refills: 0 | Status: SHIPPED | OUTPATIENT
Start: 2023-12-21 | End: 2023-12-28

## 2023-12-21 RX ORDER — MORPHINE SULFATE 10 MG/5ML
5 SOLUTION ORAL EVERY 4 HOURS PRN
Qty: 100 ML | Refills: 0 | Status: SHIPPED | OUTPATIENT
Start: 2023-12-21 | End: 2023-12-28

## 2023-12-21 RX ADMIN — HYDROXYZINE HYDROCHLORIDE 25 MG: 25 TABLET, FILM COATED ORAL at 10:54

## 2023-12-21 RX ADMIN — ATORVASTATIN CALCIUM 10 MG: 10 TABLET, FILM COATED ORAL at 08:06

## 2023-12-21 RX ADMIN — METHOCARBAMOL 1000 MG: 500 TABLET ORAL at 06:00

## 2023-12-21 RX ADMIN — METHOCARBAMOL 1000 MG: 500 TABLET ORAL at 12:14

## 2023-12-21 RX ADMIN — HYDROXYZINE HYDROCHLORIDE 25 MG: 25 TABLET, FILM COATED ORAL at 05:03

## 2023-12-21 RX ADMIN — INSULIN ASPART 2 UNITS: 100 INJECTION, SOLUTION INTRAVENOUS; SUBCUTANEOUS at 12:11

## 2023-12-21 RX ADMIN — RIFAMPIN 300 MG: 300 CAPSULE ORAL at 08:06

## 2023-12-21 RX ADMIN — OXYCODONE HYDROCHLORIDE 15 MG: 10 TABLET ORAL at 10:53

## 2023-12-21 RX ADMIN — ACETAMINOPHEN 975 MG: 325 TABLET, FILM COATED ORAL at 05:03

## 2023-12-21 RX ADMIN — INSULIN ASPART 2 UNITS: 100 INJECTION, SOLUTION INTRAVENOUS; SUBCUTANEOUS at 08:08

## 2023-12-21 RX ADMIN — DOXYCYCLINE HYCLATE 100 MG: 100 CAPSULE ORAL at 08:06

## 2023-12-21 RX ADMIN — OXYCODONE HYDROCHLORIDE 15 MG: 10 TABLET ORAL at 07:11

## 2023-12-21 RX ADMIN — LISINOPRIL 40 MG: 40 TABLET ORAL at 08:06

## 2023-12-21 RX ADMIN — SENNOSIDES AND DOCUSATE SODIUM 1 TABLET: 8.6; 5 TABLET ORAL at 08:06

## 2023-12-21 RX ADMIN — POLYETHYLENE GLYCOL 3350 17 G: 17 POWDER, FOR SOLUTION ORAL at 08:05

## 2023-12-21 RX ADMIN — OXYCODONE HYDROCHLORIDE 15 MG: 10 TABLET ORAL at 03:00

## 2023-12-21 ASSESSMENT — ACTIVITIES OF DAILY LIVING (ADL)
ADLS_ACUITY_SCORE: 27

## 2023-12-21 NOTE — PROGRESS NOTES
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  Hospital medicine was consulted for the medical management.     TODAY'S PLAN:  Stable for discharge from IM perspective.  Pt has video visit with ID later today regarding his knee infection.  On doxycycline and rifampin until June 2024.  Urinating well.  No BM yet but is passing flatus.  No new meds from IM at discharge.  Per pt, has plans to start ozempic soon.  We discussed mounjaro as an additional consideration.    Problem List:   Chronic lower back pain and bilateral sciatica  Status post L4-L5 interbody and posterior lateral fusion  -Patient has been complaining of severe pain.  Pain management team is consulted and managing patient's pain.  --Currently patient's pain control better.  Pain management team input is appreciated.  Continue pain control regimen per pain management team and discharge planning deferred to primary team     Recent right knee I&D and total knee replacement  -No acute symptoms, patient follows orthopedics and infectious disease outpatient.   -Patient was advised to continue anticoagulation for 6 weeks postsurgery she has completed.  -Further anticoagulation per spine.  -Prior to admission was on doxycycline and rifampin  -Resumed doxycycline and rifampin     Type II DM complicated by neuropathy  -Most recent A1c was 5.6 in 9/20/2023  -Prior to admission was on Trulicity and Jardiance  -Resumed Jardiance  -Started medium resistant sliding scale insulin     Hypertension  -Prior to  admission was on lisinopril  -Resumed lisinopril     I spent 35 minutes in reviewing this patient's labs, imaging, medications, medical history.  In addition time was spent interviewing the patient, communicating with family, and medical decision making.     DVT Prophylaxis: Defer to primary service  Code Status: Full Code  Diet: Advance Diet as Tolerated: Regular Diet Adult  Diet    Figueroa Catheter: Not present  Disposition: Expected discharge today to home. Goals prior to discharge include discharged by primary service.   Family updated today: No     Interval History   Pt seen and examined.  Pt reports ongoing back pain.  Denies any cp/tightness/pressure/sob.    -Data reviewed today: I personally reviewed all new labs and imaging results over the last 24 hours.     Physical Exam   Temp: 98.3  F (36.8  C) Temp src: Temporal BP: 127/77 Pulse: 81   Resp: 16 SpO2: 94 % O2 Device: None (Room air)    Vitals:    11/20/23 0700 12/07/23 0800 12/18/23 0546   Weight: 146.1 kg (322 lb) 143.8 kg (317 lb) 144.9 kg (319 lb 6.4 oz)     Vital Signs with Ranges  Temp:  [97.6  F (36.4  C)-99.6  F (37.6  C)] 98.3  F (36.8  C)  Pulse:  [54-98] 81  Resp:  [16-22] 16  BP: (115-140)/(65-77) 127/77  SpO2:  [94 %-98 %] 94 %  I/O last 3 completed shifts:  In: 2265 [P.O.:2265]  Out: 3350 [Urine:3350]    GENERAL: No apparent distress. Awake, alert, and fully oriented.  HEENT: Normocephalic, atraumatic. Extraocular movements intact.  CARDIOVASCULAR: Regular rate and rhythm without murmurs or rubs. No S3.  PULMONARY: Clear bilaterally.  GASTROINTESTINAL: Soft, non-tender, non-distended. Bowel sounds normoactive.   EXTREMITIES: No cyanosis or clubbing. No edema.  NEUROLOGICAL: CN 2-12 grossly intact, no focal neurological deficits.  DERMATOLOGICAL: No rash, ulcer, bruising, nor jaundice.    Medications    sodium chloride Stopped (12/18/23 2201)      atorvastatin  10 mg Oral Daily    doxycycline hyclate  100 mg Oral BID    insulin aspart  1-7  "Units Subcutaneous TID AC    insulin aspart  1-5 Units Subcutaneous At Bedtime    lisinopril  40 mg Oral Daily    methocarbamol  1,000 mg Oral Q6H    oxyCODONE  10 mg Oral Q4H    Or    oxyCODONE  15 mg Oral Q4H    polyethylene glycol  17 g Oral Daily    rifampin  300 mg Oral BID    senna-docusate  1 tablet Oral BID    sodium chloride (PF)  3 mL Intracatheter Q8H     Data     Laboratory:  Recent Labs   Lab 12/21/23  0707 12/20/23  0659 12/19/23  0641   WBC 9.8 9.4 11.3*   HGB 15.2 15.3 14.9   HCT 45.8 46.7 45.0   MCV 83 85 84    145* 159     Recent Labs   Lab 12/21/23  0707 12/21/23  0125 12/20/23  2156 12/20/23  1148 12/20/23  0659 12/19/23  0646 12/19/23  0641   *  --   --   --  136  --  138   POTASSIUM 4.1  --   --   --  4.0  --  4.3   CHLORIDE 102  --   --   --  103  --  103   CO2 22  --   --   --  23  --  24   ANIONGAP 10  --   --   --  10  --  11   * 233* 192*   < > 166*   < > 177*  177*   BUN 11.9  --   --   --  13.0  --  9.9   CR 0.64*  --   --   --  0.67  --  0.67   GFRESTIMATED >90  --   --   --  >90  --  >90   RAFAELA 9.0  --   --   --  8.8  --  8.6    < > = values in this interval not displayed.     No results for input(s): \"CULT\" in the last 168 hours.    Imaging:  No results found for this or any previous visit (from the past 24 hour(s)).      Davin Zee DO  [facility] Hospitalist  [address] [place] [address]  Securely message with [employer] (more info)  Text page via [email or web address] Paging/Directory   12/21/2023   " No

## 2023-12-21 NOTE — PROVIDER NOTIFICATION
Reviewed discharge instructions with pt and his spouse. Answered all their questions. Discharged home with a copy, their take home meds and their belongings.

## 2023-12-21 NOTE — PLAN OF CARE
Physical Therapy Discharge Summary    Reason for therapy discharge:    Discharged to home.    Progress towards therapy goal(s). See goals on Care Plan in [facility] electronic health record for goal details.  Goals partially met.  Barriers to achieving goals:   discharge from facility.    Therapy recommendation(s):    No further therapy is recommended.

## 2023-12-21 NOTE — DISCHARGE SUMMARY
Physician Discharge Summary     Patient ID:  Francisco Cortés  [id number]  53 year old  1970    Admit date: 12/18/2023    Discharge date and time: 12/21/23    Admitting Physician: Yovany Diaz MD     Discharge Physician: Valeria Alicia PAC    Admission Diagnoses: Spinal stenosis, lumbar region, without neurogenic claudication [M48.061]  Spondylolisthesis of lumbar region [M43.16]  H/O Spinal surgery [Z98.890]    Discharge Diagnoses: s/p L4-L5 OLLIF    Admission Condition: poor    Discharged Condition: stable    Indication for Admission: h/o spinal surgery    Hospital Course: Unremarkable; pain was the main factor    Consults: OT, PT, pain team, social work    Treatments: analgesia: Dilaudid, Oxy    Discharge Exam:  GENERAL APPEARANCE: healthy, alert and no distress  EYES: Eyes grossly normal to inspection, PERRL and conjunctivae and sclerae normal  HENT: ear canals and TM's normal, nose and mouth without ulcers or lesions, oropharynx clear and oral mucous membranes moist  NECK: no adenopathy, no asymmetry, masses, or scars and thyroid normal to palpation  RESP: lungs clear to auscultation - no rales, rhonchi or wheezes  CV: regular rates and rhythm, normal S1 S2, no S3 or S4, no murmur, click or rub, no peripheral edema and peripheral pulses strong  ABDOMEN: soft, nontender, no hepatosplenomegaly, no masses and bowel sounds normal   (male): normal male genitalia without lesions or urethral discharge, no hernia  RECTAL: normal sphincter tone, no rectal masses, prostate of normal size, smooth, nontender without nodules or masses  MS: no musculoskeletal defects are noted and gait is age appropriate without ataxia  SKIN: no suspicious lesions or rashes  NEURO: Normal strength and tone, sensory exam grossly normal, mentation intact and speech normal  PSYCH: mentation appears normal and affect normal/bright    Disposition: home    Patient Instructions:   Current Discharge Medication List        START taking these  medications    Details   methocarbamol (ROBAXIN) 750 MG tablet Take 1 tablet (750 mg) by mouth 4 times daily as needed for muscle spasms  Qty: 30 tablet, Refills: 1    Associated Diagnoses: H/O Spinal surgery      naproxen (NAPROSYN) 500 MG tablet Take 1 tablet (500 mg) by mouth 2 times daily (with meals) for 14 days  Qty: 28 tablet, Refills: 0    Associated Diagnoses: H/O Spinal surgery      oxyCODONE (OXYCONTIN) 20 MG 12 hr tablet Take 1 tablet (20 mg) by mouth every 12 hours  Qty: 14 tablet, Refills: 0    Associated Diagnoses: H/O Spinal surgery      oxyCODONE (ROXICODONE) 5 MG tablet Take 2 tablets (10 mg) by mouth every 4 hours as needed for pain  Qty: 84 tablet, Refills: 0    Associated Diagnoses: H/O Spinal surgery           CONTINUE these medications which have NOT CHANGED    Details   acetaminophen (TYLENOL) 500 MG tablet Take 1,000 mg by mouth every morning      aspirin 81 MG EC tablet Take 162 mg by mouth daily      atorvastatin (LIPITOR) 10 MG tablet Take 10 mg by mouth daily      diphenhydrAMINE (BENADRYL) 50 MG capsule Take 50 mg by mouth nightly as needed      doxycycline monohydrate (MONODOX) 100 MG capsule Take 100 mg by mouth 2 times daily      dulaglutide (TRULICITY) 1.5 MG/0.5ML pen Inject 1.5 mg Subcutaneous every 7 days      empagliflozin (JARDIANCE) 10 MG TABS tablet Take 10 mg by mouth daily      famotidine (PEPCID) 20 MG tablet Take 20 mg by mouth 2 times daily as needed      lisinopril (ZESTRIL) 40 MG tablet Take 40 mg by mouth daily      rifampin (RIFADIN) 300 MG capsule Take 300 mg by mouth 2 times daily      sildenafil (REVATIO) 20 MG tablet Take  mg by mouth as needed      testosterone (ANDROGEL 1 % PUMP) 12.5 MG/ACT (1%) gel Place 2 Pump onto the skin daily Admin #1 pump topically to each shoulder      vitamin B-Complex Take 1 tablet by mouth daily      Vitamin D3 (VITAMIN D, CHOLECALCIFEROL,) 25 mcg (1000 units) tablet Take 1 tablet by mouth daily           STOP taking these  medications       meloxicam (MOBIC) 7.5 MG tablet Comments:   Reason for Stopping:         naproxen sodium (ANAPROX) 220 MG tablet Comments:   Reason for Stopping:             Activity: activity as tolerated  Diet: regular diet  Wound Care: none needed    Follow-up with [facility] in 4 weeks.    Signed:  Valeria Alicia PA-C  12/21/2023  7:33 AM

## 2023-12-21 NOTE — PLAN OF CARE
Goal Outcome Evaluation:  Vital signs stable.  CMS intact except. baseline neuropathy in feet.  Incision open to air, steri strips intact. Log rolled to turn.  Ambulated with walker, gait belt and 1 assist in room and up to bathroom. Refused to ambulate in hallway.  Voiding.  Pain controlled with scheduled oxycodone, tylenol, robaxin and prn atarax.  Blood glucose monitored.    Plan of Care Reviewed With: patient

## 2023-12-21 NOTE — PROGRESS NOTES
Occupational Therapy Discharge Summary    Reason for therapy discharge:    All goals and outcomes met, no further needs identified. Requests discharge, no ADL concerns    Progress towards therapy goal(s). See goals on Care Plan in [facility] electronic health record for goal details.  Goals met    Therapy recommendation(s):    No further therapy is recommended.wife able to assist with ADL and IADL at home. Pt has all DME/AE needed

## 2023-12-21 NOTE — PLAN OF CARE
Goal Outcome Evaluation:      Plan of Care Reviewed With: patient    Overall Patient Progress: improvingOverall Patient Progress: improving    Outcome Evaluation: Pt up with 1 assist, walker, and gait belt.    Patient vital signs are at baseline: No,  Reason:  Temp of 99.,6, recheck of 98.6  Patient able to ambulate as they were prior to admission or with assist devices provided by therapies during their stay:  Yes  Patient MUST void prior to discharge:  Yes  Patient able to tolerate oral intake:  Yes  Pain has adequate pain control using Oral analgesics:  No, only partial relief with the Oxycodone of 15mg, Robaxin, Tylenol, and Atarax. Did better when in recliner. Pain was rating 10-12/10s except after being in recliner, then it was a 6/10.   Has goal D/C date and time been discussed with patient:  Yes    Pt alert and oriented. Pt requesting ice pack changes roughly every 2 hrs. LS posteriorly coarseness noted expiratory. Infrequent, loose cough noted. Pt stated he is trying to get some phlegm up. Instructed on IS use and encouraged when awake in which pt did demonstrate appropriately this morning. CMS intact except for baseline numbness/tingling. Incision TAMI with steri-strips and bruising noted around sites. Up in room X 2. Continued to encourage ambulation as much as possible as patient was not comfortable in bed. Plan is to discharge to home when able to meet criteria.

## 2023-12-21 NOTE — PROGRESS NOTES
DAILY PROGRESS NOTE    Francisco Cortés is a 53 year old old male admitted on 12/18/2023  5:23 AM.    Subjective  Yogesh presents POD3 From a L4-L5 OLLIF on 10/18/23.   Today pt notes that he is still having pain in his lower extremities and low back. He reports walking around with staff and is ready to be discharged. He states his wife will be working til noon and then driving to [facility] to pick him up.      Objective     AAOx3, TIWARI ,Strength intact, sensation intact  Ambulating,       Hemoglobin   Date Value Ref Range Status   12/21/2023 15.2 13.3 - 17.7 g/dL Final      Impression / Plan  Patient is ready for discharge      Plan for today:    Patient doing well  Ambulate with help  PT OT, possibly clearance   Full diet  Today  PO meds today   discharge     Valeria Haberer PAC

## (undated) DEVICE — DRSG ABDOMINAL 07 1/2X8" 7197D

## (undated) DEVICE — DRAPE IOBAN ISOLATION VERTICAL 6619

## (undated) DEVICE — GLOVE BIOGEL PI MICRO INDICATOR UNDERGLOVE SZ 8.0 48980

## (undated) DEVICE — GLOVE BIOGEL PI MICRO SZ 6.5 48565

## (undated) DEVICE — 10G BONE ACCESS TOOLS/KIT

## (undated) DEVICE — SU VICRYL 2-0 CT-2 27" UND J269H

## (undated) DEVICE — FLEXIBLE CURETTE BLADE

## (undated) DEVICE — Device

## (undated) DEVICE — SOL NACL 0.9% IRRIG 1000ML BOTTLE 2F7124

## (undated) DEVICE — LINEN DRAPE 54X72" 5467

## (undated) DEVICE — DRAPE MAYO STAND 23X54 8337

## (undated) DEVICE — CUSHION INSERT LG PRONE VIEW JACKSON TABLE

## (undated) DEVICE — DRSG GAUZE 4X4" TRAY

## (undated) DEVICE — BAG CLEAR TRASH 1.3M 39X33" P4040C

## (undated) DEVICE — DECANTER BAG 2002S

## (undated) DEVICE — BLADE KNIFE SURG 11 371111

## (undated) DEVICE — DRAPE C-ARM 60X42" 1013

## (undated) DEVICE — GLOVE BIOGEL PI SZ 8.0 40880

## (undated) DEVICE — PACK SMALL SPINE RIDGES

## (undated) DEVICE — SPONGE RAY-TEC 4X8" 7318

## (undated) DEVICE — ESU GROUND PAD ADULT W/CORD E7507

## (undated) DEVICE — DISPOSABLE MONOPOLAR PROBE

## (undated) DEVICE — LINEN ORTHO ACL PACK 5447

## (undated) DEVICE — SYR 10ML LL W/O NDL

## (undated) DEVICE — CATH TRAY FOLEY COUDE SURESTEP 16FR W/DRN BAG LATEX A304416A

## (undated) DEVICE — GLOVE BIOGEL PI MICRO INDICATOR UNDERGLOVE SZ 7.0 48970

## (undated) DEVICE — DRSG STERI STRIP 1/2X4" R1547

## (undated) RX ORDER — HYDROMORPHONE HCL IN WATER/PF 6 MG/30 ML
PATIENT CONTROLLED ANALGESIA SYRINGE INTRAVENOUS
Status: DISPENSED
Start: 2023-12-18

## (undated) RX ORDER — ACETAMINOPHEN 325 MG/1
TABLET ORAL
Status: DISPENSED
Start: 2023-12-18

## (undated) RX ORDER — BUPIVACAINE HYDROCHLORIDE 2.5 MG/ML
INJECTION, SOLUTION EPIDURAL; INFILTRATION; INTRACAUDAL
Status: DISPENSED
Start: 2023-12-18

## (undated) RX ORDER — FENTANYL CITRATE 50 UG/ML
INJECTION, SOLUTION INTRAMUSCULAR; INTRAVENOUS
Status: DISPENSED
Start: 2023-12-18

## (undated) RX ORDER — OXYCODONE HYDROCHLORIDE 5 MG/1
TABLET ORAL
Status: DISPENSED
Start: 2023-12-18

## (undated) RX ORDER — LIDOCAINE HYDROCHLORIDE 10 MG/ML
INJECTION, SOLUTION EPIDURAL; INFILTRATION; INTRACAUDAL; PERINEURAL
Status: DISPENSED
Start: 2023-12-18

## (undated) RX ORDER — GLYCOPYRROLATE 0.2 MG/ML
INJECTION INTRAMUSCULAR; INTRAVENOUS
Status: DISPENSED
Start: 2023-12-18

## (undated) RX ORDER — DEXAMETHASONE SODIUM PHOSPHATE 4 MG/ML
INJECTION, SOLUTION INTRA-ARTICULAR; INTRALESIONAL; INTRAMUSCULAR; INTRAVENOUS; SOFT TISSUE
Status: DISPENSED
Start: 2023-12-18

## (undated) RX ORDER — FENTANYL CITRATE-0.9 % NACL/PF 10 MCG/ML
PLASTIC BAG, INJECTION (ML) INTRAVENOUS
Status: DISPENSED
Start: 2023-12-18

## (undated) RX ORDER — PROPOFOL 10 MG/ML
INJECTION, EMULSION INTRAVENOUS
Status: DISPENSED
Start: 2023-12-18

## (undated) RX ORDER — VANCOMYCIN HYDROCHLORIDE 1 G/20ML
INJECTION, POWDER, LYOPHILIZED, FOR SOLUTION INTRAVENOUS
Status: DISPENSED
Start: 2023-12-18

## (undated) RX ORDER — TRANEXAMIC ACID 10 MG/ML
INJECTION, SOLUTION INTRAVENOUS
Status: DISPENSED
Start: 2023-12-18

## (undated) RX ORDER — CEFAZOLIN SODIUM/WATER 3 G/30 ML
SYRINGE (ML) INTRAVENOUS
Status: DISPENSED
Start: 2023-12-18

## (undated) RX ORDER — LABETALOL HYDROCHLORIDE 5 MG/ML
INJECTION, SOLUTION INTRAVENOUS
Status: DISPENSED
Start: 2023-12-18

## (undated) RX ORDER — METHOCARBAMOL 750 MG/1
TABLET, FILM COATED ORAL
Status: DISPENSED
Start: 2023-12-18

## (undated) RX ORDER — ONDANSETRON 2 MG/ML
INJECTION INTRAMUSCULAR; INTRAVENOUS
Status: DISPENSED
Start: 2023-12-18

## (undated) RX ORDER — HYDROXYZINE HYDROCHLORIDE 25 MG/1
TABLET, FILM COATED ORAL
Status: DISPENSED
Start: 2023-12-18